# Patient Record
Sex: FEMALE | Race: WHITE | NOT HISPANIC OR LATINO | Employment: FULL TIME | URBAN - METROPOLITAN AREA
[De-identification: names, ages, dates, MRNs, and addresses within clinical notes are randomized per-mention and may not be internally consistent; named-entity substitution may affect disease eponyms.]

---

## 2017-02-07 DIAGNOSIS — R22.1 NECK MASS: Primary | ICD-10-CM

## 2017-02-10 ENCOUNTER — HOSPITAL ENCOUNTER (OUTPATIENT)
Dept: RADIOLOGY | Facility: HOSPITAL | Age: 27
Discharge: HOME | End: 2017-02-10
Attending: SURGERY
Payer: COMMERCIAL

## 2017-02-10 DIAGNOSIS — R22.1 NECK MASS: ICD-10-CM

## 2017-02-10 PROCEDURE — 76536 US EXAM OF HEAD AND NECK: CPT | Mod: TC

## 2017-02-10 PROCEDURE — 76536 US EXAM OF HEAD AND NECK: CPT | Mod: 26,,, | Performed by: RADIOLOGY

## 2017-02-13 DIAGNOSIS — E04.1 THYROID NODULE: Primary | ICD-10-CM

## 2017-02-14 ENCOUNTER — HOSPITAL ENCOUNTER (OUTPATIENT)
Dept: RADIOLOGY | Facility: HOSPITAL | Age: 27
Discharge: HOME | End: 2017-02-14
Attending: SURGERY
Payer: COMMERCIAL

## 2017-02-14 DIAGNOSIS — E04.1 THYROID NODULE: ICD-10-CM

## 2017-02-14 PROCEDURE — 76942 ECHO GUIDE FOR BIOPSY: CPT | Mod: TC

## 2017-02-14 PROCEDURE — 76942 ECHO GUIDE FOR BIOPSY: CPT | Mod: 26,,, | Performed by: RADIOLOGY

## 2017-02-14 PROCEDURE — 88173 CYTOPATH EVAL FNA REPORT: CPT | Performed by: PATHOLOGY

## 2017-02-14 PROCEDURE — 88173 CYTOPATH EVAL FNA REPORT: CPT | Mod: 26,,, | Performed by: PATHOLOGY

## 2017-02-14 PROCEDURE — 10022 US FINE NEEDLE ASPIRATION WITH IMAGING: CPT | Mod: ,,, | Performed by: RADIOLOGY

## 2017-02-14 NOTE — PROCEDURES
Radiology Post-Procedure Note    Pre Op Diagnosis: Thyroid nodule    Post Op Diagnosis: same    Procedure: Ultrasound guided thyroid biopsy    Procedure performed by: Kailey Alfaro MD    Written Informed Consent Obtained: Yes    Specimen Removed: YES 2 passes and 2cc of fluid were aspirated and discarded    Estimated Blood Loss: Minimal    Findings: Local anesthesia was used.    Fine needle aspiration was performed for evaluation of right sided thyroid nodule using ultrasound guidance.  Cytology was present during the examination to evaluate adequacy of the specimen which was sent to the laboratory for further analysis.    2cc of fluid were aspirated and discarded.    There were no complications and the patient tolerated the procedure well.  Please see Imaging report for further details.    [facility]  Radiology PGY-3

## 2017-02-14 NOTE — H&P
Radiology History & Physical      SUBJECTIVE:     Chief Complaint: right neck swelling     History of Present Illness:  Daphnie Vazquez is a 27 y.o. female with right sided thyroid nodule who presents for US guided FNA.  No past medical history on file.  No past surgical history on file.    Home Meds:   Prior to Admission medications    Not on File     Anticoagulants/Antiplatelets: no anticoagulation    Allergies: Review of patient's allergies indicates:  No Known Allergies  Sedation History:  no adverse reactions    Review of Systems:   Hematological: no known coagulopathies  Respiratory: no shortness of breath  Cardiovascular: no chest pain  Gastrointestinal: no abdominal pain  Genito-Urinary: no dysuria  Musculoskeletal: negative  Neurological: no TIA or stroke symptoms         OBJECTIVE:     Vital Signs (Most Recent)       Physical Exam:  ASA: 1  Mallampati: n/a    General: no acute distress  Mental Status: alert and oriented to person, place and time  HEENT: normocephalic, atraumatic  Chest: unlabored breathing  Heart: regular heart rate  Abdomen: nondistended  Extremity: moves all extremities    Laboratory  No results found for: INR  No results found for: WBC, HGB, HCT, MCV, PLT No results found for: GLU, NA, K, CL, CO2, BUN, CREATININE, CALCIUM, MG, ALT, AST, ALBUMIN, BILITOT, BILIDIR    ASSESSMENT/PLAN:     Sedation Plan: local only   Patient will undergo US guided right thyroid nodule FNA.    Luis Cordova  Radiology PGY-3

## 2017-02-16 ENCOUNTER — LAB VISIT (OUTPATIENT)
Dept: LAB | Facility: HOSPITAL | Age: 27
End: 2017-02-16
Attending: SURGERY
Payer: COMMERCIAL

## 2017-02-16 ENCOUNTER — OFFICE VISIT (OUTPATIENT)
Dept: SURGERY | Facility: CLINIC | Age: 27
End: 2017-02-16
Payer: COMMERCIAL

## 2017-02-16 VITALS — HEART RATE: 83 BPM | DIASTOLIC BLOOD PRESSURE: 61 MMHG | TEMPERATURE: 98 F | SYSTOLIC BLOOD PRESSURE: 118 MMHG

## 2017-02-16 DIAGNOSIS — E04.1 THYROID NODULE: Primary | ICD-10-CM

## 2017-02-16 DIAGNOSIS — E04.1 THYROID NODULE: ICD-10-CM

## 2017-02-16 LAB — TSH SERPL DL<=0.005 MIU/L-ACNC: 1.93 UIU/ML

## 2017-02-16 PROCEDURE — 99999 PR PBB SHADOW E&M-EST. PATIENT-LVL II: CPT | Mod: PBBFAC,,, | Performed by: SURGERY

## 2017-02-16 PROCEDURE — 99204 OFFICE O/P NEW MOD 45 MIN: CPT | Mod: S$GLB,,, | Performed by: SURGERY

## 2017-02-16 PROCEDURE — 36415 COLL VENOUS BLD VENIPUNCTURE: CPT

## 2017-02-16 PROCEDURE — 84443 ASSAY THYROID STIM HORMONE: CPT

## 2017-02-20 PROBLEM — E04.1 THYROID NODULE: Status: ACTIVE | Noted: 2017-02-20

## 2017-02-20 NOTE — PROGRESS NOTES
DATE OF CONSULTATION:  02/16/2017    CHIEF COMPLAINT:  Right thyroid nodule.    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  Her history of   right thyroid nodular process dates back to approximately 2007, when she had an   approximate 3-cm right thyroid nodule identified when she was approximately a   freshman in college in 2007.  This was consistent more with a simple cystic like   lesion, which was benign on biopsy.  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  She had another repeat   right-sided fine needle aspiration biopsy at that point in 2009, which revealed   an atypia of undetermined significance/FLUS etiology.  She followed this   clinically.  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  She also has a right submandibular lymph   node, which is approximately 1.5 cm.  She underwent a recent ultrasound on   02/10/2017, which revealed a right-sided thyroid nodule measuring 3.2 cm in   greatest dimension.  It was heterogeneous with a cystic component and a   prominent vessel located inferiorly.  She also had some benign appearing   left-sided nodules, the largest of which was 1 cm.  The left-sided nodules did   not meet criteria for fine needle aspiration biopsy.  She underwent repeat fine   needle aspiration biopsy on 02/14/2017, which revealed a follicular lesion of   undetermined significance, again consistent with a FLUS on the right side.  I   reviewed the thyroid images with her.  We obtained a TSH level today at the date   of consultation on 02/16/2017, which returned at 1.929.  She is clinically and   biochemically euthyroid.  She presents today to discuss options regarding   management of her right thyroid  nodule.    PAST MEDICAL HISTORY:  Significant for Raynaud's phenomenon.    PAST SURGICAL HISTORY:  Significant for breast augmentation.    MEDICATIONS:  Include Ritalin for ADHD, initially prescribed as an adult.    ALLERGIES:  She has no known drug allergies.    REVIEW OF SYSTEMS:  Reveals that there is no family history of thyroid carcinoma   or thyroid disease.  She denies any history of radiation exposure.  She denies   any dysphasia, trouble swallowing, odynophagia or difficulty breathing.  She   denies any respiratory symptoms, shortness of breath or stridor with this   nodule.  She is clinically aware of it and detected it essentially on   self-examination.    PHYSICAL EXAMINATION:  HEAD AND NECK:  Her clinical exam reveals approximately a 3-cm right-sided   nodule.  I do feel the 1.5 cm right submandibular node.  She has an essentially   symmetrical left-sided 1 cm left submandibular node versus gland that is   palpable.  I do not feel any suspicious lymphadenopathy in the lower neck.    ASSESSMENT AND PLAN:  The patient was counseled on options.  We reviewed the   images of the thyroid.  Since she has had this nodule for several years and has   had 2 biopsies revealing AUS/FLUS and this is now her third biopsy, she desires   elective excision.  She is motivated for a hemithyroidectomy to potentially   avoid thyroid hormone replacement.  We discussed that typically approximately   30% of patients that undergo a hemithyroidectomy may still need to take some   thyroid hormone supplementation.  We discussed some of the risks of surgery   including recurrent laryngeal nerve injury, which is low, typically at 1% and   hypocalcemia and hypoparathyroidism, which is also low and technically should be   essentially absent if we are performing hemithyroidectomy only.  The patient is   motivated to proceed with hemithyroidectomy at some point.  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    We discussed the potential for completion thyroidectomy should the pathology   reveal a follicular or papillary carcinoma in preparation for potential   radioactive iodine ablation.  She understands that she may need a completion   thyroidectomy.  We discussed that the likelihood of this being a malignant   lesion ranges somewhere between 5% and 15% with this [place] 3 type   classification.  The patient will follow up with me at her discretion.  We will   also help her obtain a PCP through the [facility] system.  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  Time spent with the   patient today was 45 minutes with greater than 50% of that time in counseling.      RLC/HN  dd: 02/20/2017 13:57:12 (CST)  td: 02/21/2017 06:24:07 (CST)  Doc ID   #[id number]  Job ID #[id number]    CC:     Job # [id number].

## 2017-03-15 DIAGNOSIS — E04.1 THYROID NODULE: Primary | ICD-10-CM

## 2017-04-18 ENCOUNTER — ANESTHESIA EVENT (OUTPATIENT)
Dept: SURGERY | Facility: HOSPITAL | Age: 27
End: 2017-04-18
Payer: COMMERCIAL

## 2017-04-18 ENCOUNTER — TELEPHONE (OUTPATIENT)
Dept: SURGERY | Facility: CLINIC | Age: 27
End: 2017-04-18

## 2017-04-18 NOTE — ANESTHESIA PREPROCEDURE EVALUATION
04/18/2017  Daphnie Vazquez is a 27 y.o., female.    Anesthesia Evaluation    I have reviewed the Patient Summary Reports.    I have reviewed the Nursing Notes.   I have reviewed the Medications.     Review of Systems  Anesthesia Hx:  No problems with previous Anesthesia  History of prior surgery of interest to airway management or planning: Denies Family Hx of Anesthesia complications.   Denies Personal Hx of Anesthesia complications.   Social:  Non-Smoker    Hematology/Oncology:  Hematology Normal   Oncology Normal     EENT/Dental:EENT/Dental Normal   Cardiovascular:   Exercise tolerance: good Dysrhythmias H/O PVC's, cardiac workup negative, associated with dehydration   Pulmonary:  Pulmonary Normal    Renal/:  Renal/ Normal     Hepatic/GI:  Hepatic/GI Normal    Neurological:  Neurology Normal    Endocrine:   Thyroid nodule   Psych:   Psychiatric History          Physical Exam  General:  Well nourished    Airway/Jaw/Neck:  Airway Findings: Mouth Opening: Normal Tongue: Normal  General Airway Assessment: Adult  Mallampati: I      Dental:  Dental Findings: In tact   Chest/Lungs:  Chest/Lungs Findings: Clear to auscultation, Normal Respiratory Rate     Heart/Vascular:  Heart Findings: Rate: Normal  Rhythm: Regular Rhythm        Mental Status:  Mental Status Findings:  Cooperative, Alert and Oriented         Anesthesia Plan  Type of Anesthesia, risks & benefits discussed:  Anesthesia Type:  general  Patient's Preference:   Intra-op Monitoring Plan:   Intra-op Monitoring Plan Comments:   Post Op Pain Control Plan:   Post Op Pain Control Plan Comments:   Induction:   IV  Beta Blocker:  Patient is not currently on a Beta-Blocker (No further documentation required).       Informed Consent: Patient understands risks and agrees with Anesthesia plan.  Questions answered. Anesthesia consent signed with  patient.  ASA Score: 1     Day of Surgery Review of History & Physical:    H&P update referred to the surgeon.         Ready For Surgery From Anesthesia Perspective.

## 2017-04-18 NOTE — TELEPHONE ENCOUNTER
Spoke with pt, she is to arrive for surgery with Dr Rooney on 4/19/2017 @ 9am on the 2nd floor [facility], pt verbally  stated understanding

## 2017-04-19 ENCOUNTER — HOSPITAL ENCOUNTER (OUTPATIENT)
Facility: HOSPITAL | Age: 27
Discharge: HOME | End: 2017-04-20
Attending: SURGERY | Admitting: SURGERY
Payer: COMMERCIAL

## 2017-04-19 ENCOUNTER — ANESTHESIA (OUTPATIENT)
Dept: SURGERY | Facility: HOSPITAL | Age: 27
End: 2017-04-19
Payer: COMMERCIAL

## 2017-04-19 DIAGNOSIS — E04.1 THYROID NODULE: ICD-10-CM

## 2017-04-19 PROCEDURE — 71000033 HC RECOVERY, INTIAL HOUR: Performed by: SURGERY

## 2017-04-19 PROCEDURE — 60220 PARTIAL REMOVAL OF THYROID: CPT | Mod: RT,,, | Performed by: SURGERY

## 2017-04-19 PROCEDURE — 25000003 PHARM REV CODE 250: Performed by: STUDENT IN AN ORGANIZED HEALTH CARE EDUCATION/TRAINING PROGRAM

## 2017-04-19 PROCEDURE — 25000003 PHARM REV CODE 250: Performed by: ANESTHESIOLOGY

## 2017-04-19 PROCEDURE — 94761 N-INVAS EAR/PLS OXIMETRY MLT: CPT

## 2017-04-19 PROCEDURE — 63600175 PHARM REV CODE 636 W HCPCS: Performed by: ANESTHESIOLOGY

## 2017-04-19 PROCEDURE — 88307 TISSUE EXAM BY PATHOLOGIST: CPT

## 2017-04-19 PROCEDURE — 36000707: Performed by: SURGERY

## 2017-04-19 PROCEDURE — 27201423 OPTIME MED/SURG SUP & DEVICES STERILE SUPPLY: Performed by: SURGERY

## 2017-04-19 PROCEDURE — 88331 PATH CONSLTJ SURG 1 BLK 1SPC: CPT | Mod: 26,,,

## 2017-04-19 PROCEDURE — 63600175 PHARM REV CODE 636 W HCPCS: Performed by: STUDENT IN AN ORGANIZED HEALTH CARE EDUCATION/TRAINING PROGRAM

## 2017-04-19 PROCEDURE — 37000009 HC ANESTHESIA EA ADD 15 MINS: Performed by: SURGERY

## 2017-04-19 PROCEDURE — 63600175 PHARM REV CODE 636 W HCPCS

## 2017-04-19 PROCEDURE — C1729 CATH, DRAINAGE: HCPCS | Performed by: SURGERY

## 2017-04-19 PROCEDURE — 36000706: Performed by: SURGERY

## 2017-04-19 PROCEDURE — 88307 TISSUE EXAM BY PATHOLOGIST: CPT | Mod: 26,,,

## 2017-04-19 PROCEDURE — D9220A PRA ANESTHESIA: Mod: ,,, | Performed by: ANESTHESIOLOGY

## 2017-04-19 PROCEDURE — 25000003 PHARM REV CODE 250: Performed by: SURGERY

## 2017-04-19 PROCEDURE — 37000008 HC ANESTHESIA 1ST 15 MINUTES: Performed by: SURGERY

## 2017-04-19 PROCEDURE — 71000039 HC RECOVERY, EACH ADD'L HOUR: Performed by: SURGERY

## 2017-04-19 RX ORDER — ONDANSETRON 8 MG/1
8 TABLET, ORALLY DISINTEGRATING ORAL EVERY 8 HOURS PRN
Status: DISCONTINUED | OUTPATIENT
Start: 2017-04-19 | End: 2017-04-20 | Stop reason: HOSPADM

## 2017-04-19 RX ORDER — DOCUSATE SODIUM 100 MG/1
100 CAPSULE, LIQUID FILLED ORAL 2 TIMES DAILY
Status: DISCONTINUED | OUTPATIENT
Start: 2017-04-19 | End: 2017-04-20 | Stop reason: HOSPADM

## 2017-04-19 RX ORDER — BUPIVACAINE HYDROCHLORIDE 5 MG/ML
INJECTION, SOLUTION EPIDURAL; INTRACAUDAL
Status: DISCONTINUED | OUTPATIENT
Start: 2017-04-19 | End: 2017-04-19 | Stop reason: HOSPADM

## 2017-04-19 RX ORDER — DIPHENHYDRAMINE HYDROCHLORIDE 50 MG/ML
25 INJECTION INTRAMUSCULAR; INTRAVENOUS EVERY 4 HOURS PRN
Status: DISCONTINUED | OUTPATIENT
Start: 2017-04-19 | End: 2017-04-20 | Stop reason: HOSPADM

## 2017-04-19 RX ORDER — SODIUM CHLORIDE 0.9 % (FLUSH) 0.9 %
3 SYRINGE (ML) INJECTION EVERY 8 HOURS
Status: DISCONTINUED | OUTPATIENT
Start: 2017-04-19 | End: 2017-04-20 | Stop reason: HOSPADM

## 2017-04-19 RX ORDER — HYDROMORPHONE HYDROCHLORIDE 1 MG/ML
0.2 INJECTION, SOLUTION INTRAMUSCULAR; INTRAVENOUS; SUBCUTANEOUS EVERY 5 MIN PRN
Status: DISCONTINUED | OUTPATIENT
Start: 2017-04-19 | End: 2017-04-19 | Stop reason: HOSPADM

## 2017-04-19 RX ORDER — ONDANSETRON 2 MG/ML
INJECTION INTRAMUSCULAR; INTRAVENOUS
Status: DISCONTINUED | OUTPATIENT
Start: 2017-04-19 | End: 2017-04-19

## 2017-04-19 RX ORDER — FENTANYL CITRATE 50 UG/ML
INJECTION, SOLUTION INTRAMUSCULAR; INTRAVENOUS
Status: DISCONTINUED | OUTPATIENT
Start: 2017-04-19 | End: 2017-04-19

## 2017-04-19 RX ORDER — IBUPROFEN 600 MG/1
600 TABLET ORAL EVERY 6 HOURS PRN
Status: DISCONTINUED | OUTPATIENT
Start: 2017-04-19 | End: 2017-04-20 | Stop reason: HOSPADM

## 2017-04-19 RX ORDER — LIDOCAINE HYDROCHLORIDE 10 MG/ML
1 INJECTION, SOLUTION EPIDURAL; INFILTRATION; INTRACAUDAL; PERINEURAL ONCE
Status: COMPLETED | OUTPATIENT
Start: 2017-04-19 | End: 2017-04-19

## 2017-04-19 RX ORDER — SODIUM CHLORIDE 0.9 % (FLUSH) 0.9 %
3 SYRINGE (ML) INJECTION
Status: DISCONTINUED | OUTPATIENT
Start: 2017-04-19 | End: 2017-04-19

## 2017-04-19 RX ORDER — LIDOCAINE HCL/PF 100 MG/5ML
SYRINGE (ML) INTRAVENOUS
Status: DISCONTINUED | OUTPATIENT
Start: 2017-04-19 | End: 2017-04-19

## 2017-04-19 RX ORDER — KETAMINE HCL IN 0.9 % NACL 50 MG/5 ML
SYRINGE (ML) INTRAVENOUS
Status: DISCONTINUED | OUTPATIENT
Start: 2017-04-19 | End: 2017-04-19

## 2017-04-19 RX ORDER — MIDAZOLAM HYDROCHLORIDE 1 MG/ML
INJECTION, SOLUTION INTRAMUSCULAR; INTRAVENOUS
Status: DISCONTINUED | OUTPATIENT
Start: 2017-04-19 | End: 2017-04-19

## 2017-04-19 RX ORDER — SODIUM CHLORIDE 9 MG/ML
INJECTION, SOLUTION INTRAVENOUS CONTINUOUS
Status: DISCONTINUED | OUTPATIENT
Start: 2017-04-19 | End: 2017-04-19

## 2017-04-19 RX ORDER — OXYCODONE AND ACETAMINOPHEN 5; 325 MG/1; MG/1
TABLET ORAL
Status: DISCONTINUED
Start: 2017-04-19 | End: 2017-04-19 | Stop reason: WASHOUT

## 2017-04-19 RX ORDER — ONDANSETRON 2 MG/ML
4 INJECTION INTRAMUSCULAR; INTRAVENOUS ONCE AS NEEDED
Status: COMPLETED | OUTPATIENT
Start: 2017-04-19 | End: 2017-04-19

## 2017-04-19 RX ORDER — PROPOFOL 10 MG/ML
VIAL (ML) INTRAVENOUS
Status: DISCONTINUED | OUTPATIENT
Start: 2017-04-19 | End: 2017-04-19

## 2017-04-19 RX ORDER — ONDANSETRON 2 MG/ML
4 INJECTION INTRAMUSCULAR; INTRAVENOUS DAILY PRN
Status: DISCONTINUED | OUTPATIENT
Start: 2017-04-19 | End: 2017-04-19 | Stop reason: HOSPADM

## 2017-04-19 RX ORDER — DEXAMETHASONE SODIUM PHOSPHATE 4 MG/ML
INJECTION, SOLUTION INTRA-ARTICULAR; INTRALESIONAL; INTRAMUSCULAR; INTRAVENOUS; SOFT TISSUE
Status: DISCONTINUED | OUTPATIENT
Start: 2017-04-19 | End: 2017-04-19

## 2017-04-19 RX ORDER — PHENYLEPHRINE HYDROCHLORIDE 10 MG/ML
INJECTION INTRAVENOUS
Status: DISCONTINUED | OUTPATIENT
Start: 2017-04-19 | End: 2017-04-19

## 2017-04-19 RX ORDER — HYDROMORPHONE HYDROCHLORIDE 1 MG/ML
INJECTION, SOLUTION INTRAMUSCULAR; INTRAVENOUS; SUBCUTANEOUS
Status: DISCONTINUED
Start: 2017-04-19 | End: 2017-04-19 | Stop reason: WASHOUT

## 2017-04-19 RX ORDER — ACETAMINOPHEN 325 MG/1
650 TABLET ORAL EVERY 8 HOURS PRN
Status: DISCONTINUED | OUTPATIENT
Start: 2017-04-19 | End: 2017-04-20 | Stop reason: HOSPADM

## 2017-04-19 RX ORDER — ONDANSETRON 2 MG/ML
INJECTION INTRAMUSCULAR; INTRAVENOUS
Status: COMPLETED
Start: 2017-04-19 | End: 2017-04-19

## 2017-04-19 RX ORDER — HYDROMORPHONE HYDROCHLORIDE 2 MG/ML
INJECTION, SOLUTION INTRAMUSCULAR; INTRAVENOUS; SUBCUTANEOUS
Status: DISCONTINUED | OUTPATIENT
Start: 2017-04-19 | End: 2017-04-19

## 2017-04-19 RX ORDER — CEFAZOLIN SODIUM 2 G/50ML
2 SOLUTION INTRAVENOUS
Status: COMPLETED | OUTPATIENT
Start: 2017-04-19 | End: 2017-04-19

## 2017-04-19 RX ORDER — ACETAMINOPHEN 325 MG/1
650 TABLET ORAL EVERY 6 HOURS PRN
Status: DISCONTINUED | OUTPATIENT
Start: 2017-04-19 | End: 2017-04-20 | Stop reason: HOSPADM

## 2017-04-19 RX ORDER — OXYCODONE AND ACETAMINOPHEN 5; 325 MG/1; MG/1
1 TABLET ORAL EVERY 4 HOURS PRN
Status: DISCONTINUED | OUTPATIENT
Start: 2017-04-19 | End: 2017-04-20 | Stop reason: HOSPADM

## 2017-04-19 RX ADMIN — REMIFENTANIL HYDROCHLORIDE 0.1 MCG/KG/MIN: 1 INJECTION, POWDER, LYOPHILIZED, FOR SOLUTION INTRAVENOUS at 11:04

## 2017-04-19 RX ADMIN — MIDAZOLAM HYDROCHLORIDE 1 MG: 1 INJECTION, SOLUTION INTRAMUSCULAR; INTRAVENOUS at 10:04

## 2017-04-19 RX ADMIN — EPHEDRINE SULFATE 10 MG: 50 INJECTION, SOLUTION INTRAMUSCULAR; INTRAVENOUS; SUBCUTANEOUS at 11:04

## 2017-04-19 RX ADMIN — ONDANSETRON 4 MG: 2 INJECTION INTRAMUSCULAR; INTRAVENOUS at 01:04

## 2017-04-19 RX ADMIN — OXYCODONE HYDROCHLORIDE AND ACETAMINOPHEN 1 TABLET: 5; 325 TABLET ORAL at 09:04

## 2017-04-19 RX ADMIN — EPHEDRINE SULFATE 5 MG: 50 INJECTION, SOLUTION INTRAMUSCULAR; INTRAVENOUS; SUBCUTANEOUS at 01:04

## 2017-04-19 RX ADMIN — DOCUSATE SODIUM 100 MG: 100 CAPSULE, LIQUID FILLED ORAL at 09:04

## 2017-04-19 RX ADMIN — SODIUM CHLORIDE 500 ML: 0.9 INJECTION, SOLUTION INTRAVENOUS at 02:04

## 2017-04-19 RX ADMIN — HYDROMORPHONE HYDROCHLORIDE 0.2 MG: 2 INJECTION, SOLUTION INTRAMUSCULAR; INTRAVENOUS; SUBCUTANEOUS at 01:04

## 2017-04-19 RX ADMIN — LIDOCAINE HYDROCHLORIDE 0.2 MG: 10 INJECTION, SOLUTION EPIDURAL; INFILTRATION; INTRACAUDAL; PERINEURAL at 10:04

## 2017-04-19 RX ADMIN — PROPOFOL 100 MG: 10 INJECTION, EMULSION INTRAVENOUS at 11:04

## 2017-04-19 RX ADMIN — PROMETHAZINE HYDROCHLORIDE 6.25 MG: 25 INJECTION, SOLUTION INTRAMUSCULAR; INTRAVENOUS at 02:04

## 2017-04-19 RX ADMIN — Medication 3 ML: at 09:04

## 2017-04-19 RX ADMIN — ACETAMINOPHEN 650 MG: 325 TABLET ORAL at 04:04

## 2017-04-19 RX ADMIN — EPHEDRINE SULFATE 5 MG: 50 INJECTION, SOLUTION INTRAMUSCULAR; INTRAVENOUS; SUBCUTANEOUS at 11:04

## 2017-04-19 RX ADMIN — SODIUM CHLORIDE: 0.9 INJECTION, SOLUTION INTRAVENOUS at 10:04

## 2017-04-19 RX ADMIN — PHENYLEPHRINE HYDROCHLORIDE 100 MCG: 10 INJECTION INTRAVENOUS at 11:04

## 2017-04-19 RX ADMIN — SODIUM CHLORIDE, SODIUM GLUCONATE, SODIUM ACETATE, POTASSIUM CHLORIDE, MAGNESIUM CHLORIDE, SODIUM PHOSPHATE, DIBASIC, AND POTASSIUM PHOSPHATE: .53; .5; .37; .037; .03; .012; .00082 INJECTION, SOLUTION INTRAVENOUS at 12:04

## 2017-04-19 RX ADMIN — Medication 20 MG: at 11:04

## 2017-04-19 RX ADMIN — EPHEDRINE SULFATE 5 MG: 50 INJECTION, SOLUTION INTRAMUSCULAR; INTRAVENOUS; SUBCUTANEOUS at 12:04

## 2017-04-19 RX ADMIN — HYDROMORPHONE HYDROCHLORIDE 0.4 MG: 2 INJECTION, SOLUTION INTRAMUSCULAR; INTRAVENOUS; SUBCUTANEOUS at 12:04

## 2017-04-19 RX ADMIN — FENTANYL CITRATE 100 MCG: 50 INJECTION, SOLUTION INTRAMUSCULAR; INTRAVENOUS at 11:04

## 2017-04-19 RX ADMIN — DEXAMETHASONE SODIUM PHOSPHATE 4 MG: 4 INJECTION, SOLUTION INTRAMUSCULAR; INTRAVENOUS at 11:04

## 2017-04-19 RX ADMIN — REMIFENTANIL HYDROCHLORIDE 60 MCG: 1 INJECTION, POWDER, LYOPHILIZED, FOR SOLUTION INTRAVENOUS at 11:04

## 2017-04-19 RX ADMIN — CEFAZOLIN SODIUM 2 G: 2 SOLUTION INTRAVENOUS at 11:04

## 2017-04-19 RX ADMIN — LIDOCAINE HYDROCHLORIDE 60 MG: 20 INJECTION, SOLUTION INTRAVENOUS at 11:04

## 2017-04-19 NOTE — TRANSFER OF CARE
"Anesthesia Transfer of Care Note    Patient: Daphnie Vazquez    Procedure(s) Performed: Procedure(s):  THYROIDECTOMY, right alejandra thyroidectomy    Patient location: PACU    Anesthesia Type: general    Transport from OR: Transported from OR on room air with adequate spontaneous ventilation    Post pain: adequate analgesia    Post assessment: no apparent anesthetic complications    Post vital signs: stable    Level of consciousness: awake and alert    Nausea/Vomiting: no nausea/vomiting    Complications: none          Last vitals:   Visit Vitals    /73 (BP Location: Left arm, Patient Position: Lying, BP Method: Automatic)    Pulse (!) 53    Temp 36.8 °C (98.2 °F) (Oral)    Resp 18    Ht 5' 9" (1.753 m)    Wt 56.7 kg (125 lb)    LMP 04/17/2017    SpO2 100%    Breastfeeding No    BMI 18.46 kg/m2     "

## 2017-04-19 NOTE — H&P
DATE OF CONSULTATION: 02/16/2017     CHIEF COMPLAINT: Right thyroid nodule.     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 Her history of   right thyroid nodular process dates back to approximately 2007, when she had an   approximate 3-cm right thyroid nodule identified when she was approximately a   freshman in college in 2007. This was consistent more with a simple cystic like  lesion, which was benign on biopsy. 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 She had another repeat   right-sided fine needle aspiration biopsy at that point in 2009, which revealed   an atypia of undetermined significance/FLUS etiology. She followed this   clinically. 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 She also has a right submandibular lymph   node, which is approximately 1.5 cm. She underwent a recent ultrasound on   02/10/2017, which revealed a right-sided thyroid nodule measuring 3.2 cm in   greatest dimension. It was heterogeneous with a cystic component and a   prominent vessel located inferiorly. She also had some benign appearing   left-sided nodules, the largest of which was 1 cm. The left-sided nodules did   not meet criteria for fine needle aspiration biopsy. She underwent repeat fine   needle aspiration biopsy on 02/14/2017, which revealed a follicular lesion of   undetermined significance, again consistent with a FLUS on the right side. I   reviewed the thyroid images with her. We obtained a TSH level today at the date  of consultation on 02/16/2017, which returned at 1.929. She is clinically and   biochemically euthyroid. She presents today to discuss options regarding   management of her right thyroid nodule.     PAST MEDICAL  HISTORY: Significant for Raynaud's phenomenon.     PAST SURGICAL HISTORY: Significant for breast augmentation.     MEDICATIONS: Include Ritalin for ADHD, initially prescribed as an adult.     ALLERGIES: She has no known drug allergies.     REVIEW OF SYSTEMS: Reveals that there is no family history of thyroid carcinoma  or thyroid disease. She denies any history of radiation exposure. She denies   any dysphasia, trouble swallowing, odynophagia or difficulty breathing. She   denies any respiratory symptoms, shortness of breath or stridor with this   nodule. She is clinically aware of it and detected it essentially on   self-examination.     PHYSICAL EXAMINATION:  HEAD AND NECK: Her clinical exam reveals approximately a 3-cm right-sided   nodule. I do feel the 1.5 cm right submandibular node. She has an essentially   symmetrical left-sided 1 cm left submandibular node versus gland that is   palpable. I do not feel any suspicious lymphadenopathy in the lower neck.     ASSESSMENT AND PLAN: The patient was counseled on options. We reviewed the   images of the thyroid. Since she has had this nodule for several years and has   had 2 biopsies revealing AUS/FLUS and this is now her third biopsy, she desires   elective excision. She is motivated for a hemithyroidectomy to potentially   avoid thyroid hormone replacement. We discussed that typically approximately   30% of patients that undergo a hemithyroidectomy may still need to take some   thyroid hormone supplementation. We discussed some of the risks of surgery   including recurrent laryngeal nerve injury, which is low, typically at 1% and   hypocalcemia and hypoparathyroidism, which is also low and technically should be  essentially absent if we are performing hemithyroidectomy only. The patient is  motivated to proceed with hemithyroidectomy at some point. She does have her   vacation planned and will assess to see if she has time available through [facility]   during her  internship to see when/if she would like to proceed with the   right-sided hemithyroidectomy for the dominant greater than 3 cm nodule, which   has been present for years and has had 2 biopsies with AUS/FLUS characteristics.  We discussed the potential for completion thyroidectomy should the pathology   reveal a follicular or papillary carcinoma in preparation for potential   radioactive iodine ablation. She understands that she may need a completion   thyroidectomy. We discussed that the likelihood of this being a malignant   lesion ranges somewhere between 5% and 15% with this [place] 3 type   classification. The patient will follow up with me at her discretion. We will   also help her obtain a PCP through the [facility] system. 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 Time spent with the   patient today was 45 minutes with greater than 50% of that time in counseling.    H&P ADDENDUM    The patient has been examined and the H&P has been reviewed:    I concur with the findings and no changes have occurred since H&P was written.    Anesthesia/Surgery risks, benefits and alternative options discussed and understood by patient/family.    Francoise Almonte MD  General Surgery PGY3  Beeper: [phone]

## 2017-04-19 NOTE — ANESTHESIA POSTPROCEDURE EVALUATION
"Anesthesia Post Evaluation    Patient: Daphnie Vazquez    Procedure(s) Performed: Procedure(s):  THYROIDECTOMY, right alejandra thyroidectomy    Final Anesthesia Type: general  Patient location during evaluation: PACU  Patient participation: Yes- Able to Participate  Level of consciousness: awake and alert  Post-procedure vital signs: reviewed and stable  Pain management: adequate  Airway patency: patent  PONV status at discharge: No PONV  Anesthetic complications: no      Cardiovascular status: blood pressure returned to baseline  Respiratory status: unassisted, spontaneous ventilation and room air  Hydration status: euvolemic  Follow-up not needed.        Visit Vitals    /68    Pulse 93    Temp 37.1 °C (98.8 °F) (Oral)    Resp 12    Ht 5' 9" (1.753 m)    Wt 56.7 kg (125 lb)    LMP 04/17/2017    SpO2 99%    Breastfeeding No    BMI 18.46 kg/m2       Pain/Magdalena Score: Pain Assessment Performed: Yes (4/19/2017  1:45 PM)  Presence of Pain: complains of pain/discomfort (4/19/2017  2:45 PM)  Magdalena Score: 9 (4/19/2017  1:45 PM)      "

## 2017-04-19 NOTE — PLAN OF CARE
VSS. Respirations even and unlabored. Pt reports pain is tolerable. Plan of care discussed with patient. Pt verbalized understanding.

## 2017-04-19 NOTE — BRIEF OP NOTE
[facility]  Brief Operative Note    SUMMARY     Surgery Date: 4/19/2017     Surgeon(s) and Role:     * Francoise Almonte MD - Resident - Assisting     * Laith Rooney MD - Primary        Pre-op Diagnosis:  Thyroid nodule [E04.1]    Post-op Diagnosis:  Post-Op Diagnosis Codes:     * Thyroid nodule [E04.1]    Procedure(s):  THYROIDECTOMY, right alejandra thyroidectomy    Anesthesia: * No anesthesia type entered *    Description of Procedure: Right Hemithyroidectomy. Frozen negative for any malignancy    Description of the findings of the procedure: See OP note    Estimated Blood Loss: * No values recorded between 4/19/2017 11:50 AM and 4/19/2017  1:20 PM *         Specimens:   Specimen (12h ago through future)    Start     Ordered    04/19/17 1258  Specimen to Pathology - Surgery  Once     Comments:  1. Right thyroid lobe-frozen, short stitch marks superior pole, long stitch marks isthmus    04/19/17 1258

## 2017-04-19 NOTE — PLAN OF CARE
Problem: Patient Care Overview  Goal: Plan of Care Review  Outcome: Ongoing (interventions implemented as appropriate)  POC reviewed with pt, verbalized understanding. Pt AAOx4, VSS. Pt up ad harinder. Pt tolerating diet. Pt c/o soreness to anterior neck near incision; PO tylenol given. Pt denies any nausea/vomiting at this time. Pt remains free of any falls/injury. Call light within reach, will continue to monitor.

## 2017-04-19 NOTE — NURSING TRANSFER
Nursing Transfer Note      4/19/2017     Transfer To: 610    Transfer via stretcher    Transfer with glasses    Transported by pct    Medicines sent: no    Chart send with patient: Yes    Notified: mother

## 2017-04-19 NOTE — ANESTHESIA RELEASE NOTE
"Anesthesia Release from PACU Note    Patient: Dahpnie Vazquez    Procedure(s) Performed: Procedure(s):  THYROIDECTOMY, right alejandra thyroidectomy    Anesthesia type: general    Post pain: Adequate analgesia    Post assessment: no apparent anesthetic complications and tolerated procedure well    Last Vitals:   Visit Vitals    /68    Pulse 93    Temp 37.1 °C (98.8 °F) (Oral)    Resp 12    Ht 5' 9" (1.753 m)    Wt 56.7 kg (125 lb)    LMP 04/17/2017    SpO2 99%    Breastfeeding No    BMI 18.46 kg/m2       Post vital signs: stable    Level of consciousness: awake and alert     Nausea/Vomiting: no nausea/no vomiting    Complications: none    Airway Patency: patent    Respiratory: unassisted, spontaneous ventilation, room air    Cardiovascular: stable and blood pressure at baseline    Hydration: euvolemic  "

## 2017-04-19 NOTE — IP AVS SNAPSHOT
[facility]  [address]  [facility] [address]  Phone: [phone]           Patient Discharge Instructions   Our goal is to set you up for success. This packet includes information on your condition, medications, and your home care.  It will help you care for yourself to prevent having to return to the hospital.     Please ask your nurse if you have any questions.      There are many details to remember when preparing to leave the hospital. Here is what you will need to do:    1. Take your medicine. If you are prescribed medications, review your Medication List on the following pages. You may have new medications to  at the pharmacy and others that you'll need to stop taking. Review the instructions for how and when to take your medications. Talk with your doctor or nurses if you are unsure of what to do.     2. Go to your follow-up appointments. Specific follow-up information is listed in the following pages. Your may be contacted by a nurse or clinical provider about future appointments. Be sure we have all of the phone numbers to reach you. Please contact your provider's office if you are unable to make an appointment.     3. Watch for warning signs. Your doctor or nurse will give you detailed warning signs to watch for and when to call for assistance. These instructions may also include educational information about your condition. If you experience any of warning signs to your health, call your doctor.           [facility]  Unless otherwise directed by your provider, please   contact [facility], our nurse care line   that is available for 24/7 assistance.     [phone] (toll-free)     Registered nurses in the [facility]   provide: appointment scheduling, clinical advisement, health education, and other advisory services.                  ** Verify the list of medication(s) below is accurate and up to date. Carry this with you in case of  emergency. If your medications have changed, please notify your healthcare provider.             Medication List      START taking these medications        Additional Info                      ondansetron 8 MG Tbdl   Commonly known as:  ZOFRAN-ODT   Quantity:  20 tablet   Refills:  0   Dose:  8 mg    Instructions:  Take 1 tablet (8 mg total) by mouth every 6 (six) hours as needed.     Begin Date    AM    Noon    PM    Bedtime       oxycodone-acetaminophen 5-325 mg per tablet   Commonly known as:  PERCOCET   Quantity:  60 tablet   Refills:  0   Dose:  1 tablet    Last time this was given:  1 tablet on 4/20/2017  4:04 AM   Instructions:  Take 1 tablet by mouth every 4 (four) hours as needed for Pain.     Begin Date    AM    Noon    PM    Bedtime            Where to Get Your Medications      You can get these medications from any pharmacy     Bring a paper prescription for each of these medications     ondansetron 8 MG Tbdl    oxycodone-acetaminophen 5-325 mg per tablet                  Please bring to all follow up appointments:    1. A copy of your discharge instructions.  2. All medicines you are currently taking in their original bottles.  3. Identification and insurance card.    Please arrive 15 minutes ahead of scheduled appointment time.    Please call 24 hours in advance if you must reschedule your appointment and/or time.        Your Scheduled Appointments     May 04, 2017  4:15 PM CDT   Post OP with MD Jordan Carlos [facility] )    [address]  [place] [address] [phone]              Follow-up Information     Follow up with Laith Rooney MD In 1 week.    Specialty:  General Surgery    Contact information:    [address] [facility] [address] [phone]          Discharge Instructions     Future Orders    Activity as tolerated     Call MD for:  difficulty breathing or increased cough     Call MD for:  increased confusion or  weakness     Call MD for:  persistent dizziness, light-headedness, or visual disturbances     Call MD for:  persistent nausea and vomiting or diarrhea     Call MD for:  redness, tenderness, or signs of infection (pain, swelling, redness, odor or green/yellow discharge around incision site)     Call MD for:  severe persistent headache     Call MD for:  severe uncontrolled pain     Call MD for:  temperature >100.4     Call MD for:  worsening rash     Diet general     Questions:    Total calories:      Fat restriction, if any:      Protein restriction, if any:      Na restriction, if any:      Fluid restriction:      Additional restrictions:      Shower on day dressing removed (No bath)         Discharge Instructions       Recovering after Endocrine Surgery    Type of Procedure: Thyroidectomy    Postoperative clinic appointment date: 1-2 weeks after surgery    Activity:  You may resume normal daily activities upon discharge.  This includes walking and climbing stairs.  Avoid heavy lifting and vigorous exercise for approximately 2 weeks.    Showering:  You may resume normal showering and bathing with the plastic dressing in place.  Your incision does not require special care and it may get wet after 48 hours.    Incision:  Your neck incision is closed with absorbable sutures under the skin.  You will not need to have any stitches removed.  Small pieces of tape called Steri-Strips cover the outside of your incision.  These strips will be removed during your postoperative visit or will fall off on their own. You have a plastic dressing over the incision- this plastic dressing keeps the incision water-proof, you can remove the plastic dressing 48 hours after surgery.    Pain:  After surgery you may experience pain at the incision, generalized neck pain, or a sore throat.  You will be given a prescription for pain relief.  Most patients will still have discomfort 2-3 days after surgery.  Many times, over the counter pain  medications, such as Extra Strength Tylenol, are effective.    Driving:  Use your judgment in resuming to drive.  Avoid driving if you are still experiencing neck pain and are using prescription pain medications.      Return to Work:  Recovery after surgery depends on the individual.  Most patients can expect to return to work approximately 1-2 weeks after surgery.    Diet:  You may resume your normal diet after surgery without any restrictions.    Constipation:  Anesthesia and pain medication can cause a decrease in bowel motility.  If you experience constipation postoperatively, you may take over the counter laxatives such as Milk of Magnesia.    Calcium Supplements:  Calcium tablets are recommended after your surgery. You should take 1 gram of Calcium twice a day (1 gram in the morning, 1 gram in the evening) for the first week, then 1 gram of Calcium per day for the second week. Most often this is to promote bone health and prevent low blood calcium during recovery.  Numbness and tingling in your fingertips or around your mouth may be experienced when calcium levels are low.  If tingling or numbness occurs, double up on the Calcium; if you experience cramping in your hands or legs, double up on the Calcium and call you doctor.  Extra Strength Tums may be substituted for calcium tablets.    Medications:  You may resume taking medication as instructed by your MD at discharge from the hospital.     Questions/Concerns:  If you have any questions or concerns please call your doctor's office or after hours call [phone] and ask for the General Surgery Resident on call.  Dr. Dee Bondyrn Cope [facility]  [phone]        Primary Diagnosis     Your primary diagnosis was:  Thyroid Nodule      Admission Information     Date & Time Provider Department CSN    4/19/2017  9:07 AM Laith Rooney MD [facility] [id number]     "  Care Providers     Provider Role Specialty Primary office phone    Laith Rooney MD Attending Provider General Surgery [phone]    Laith Rooney MD Surgeon  General Surgery [phone]      Your Vitals Were     BP Pulse Temp Resp Height Weight    120/73 (BP Location: Right arm, Patient Position: Lying, BP Method: Automatic) 67 98.1 °F (36.7 °C) (Oral) 16 5' 9" (1.753 m) 56.7 kg (125 lb)    Last Period SpO2 BMI          04/17/2017 100% 18.46 kg/m2        Recent Lab Values     No lab values to display.      Pending Labs     Order Current Status    Specimen to Pathology - Surgery In process      Allergies as of 4/20/2017     No Known Allergies      Advance Directives     An advance directive is a document which, in the event you are no longer able to make decisions for yourself, tells your healthcare team what kind of treatment you do or do not want to receive, or who you would like to make those decisions for you.  If you do not currently have an advance directive, [facility] encourages you to create one.  For more information call:  [phone]), [phone] ([phone]),  or log on to [email or web address].        Language Assistance Services     ATTENTION: Language assistance services are available, free of charge. Please call [phone].      ATENCIÓN: Si habla español, tiene a brewer disposición servicios gratuitos de asistencia lingüística. Llame al [phone].     CHÚ Ý: N?u b?n nói Ti?ng Vi?t, có các d?ch v? h? tr? ngôn ng? mi?n phí dành cho b?n. G?i s? [phone].         [facility] complies with applicable Federal civil rights laws and does not discriminate on the basis of race, color, national origin, age, disability, or sex.        "

## 2017-04-20 VITALS
SYSTOLIC BLOOD PRESSURE: 120 MMHG | DIASTOLIC BLOOD PRESSURE: 73 MMHG | WEIGHT: 125 LBS | TEMPERATURE: 98 F | RESPIRATION RATE: 16 BRPM | HEART RATE: 67 BPM | BODY MASS INDEX: 18.51 KG/M2 | OXYGEN SATURATION: 100 % | HEIGHT: 69 IN

## 2017-04-20 PROCEDURE — 25000003 PHARM REV CODE 250: Performed by: STUDENT IN AN ORGANIZED HEALTH CARE EDUCATION/TRAINING PROGRAM

## 2017-04-20 RX ORDER — ONDANSETRON 8 MG/1
8 TABLET, ORALLY DISINTEGRATING ORAL EVERY 6 HOURS PRN
Qty: 20 TABLET | Refills: 0 | Status: SHIPPED | OUTPATIENT
Start: 2017-04-20 | End: 2018-03-05

## 2017-04-20 RX ORDER — OXYCODONE AND ACETAMINOPHEN 5; 325 MG/1; MG/1
1 TABLET ORAL EVERY 4 HOURS PRN
Qty: 60 TABLET | Refills: 0 | Status: SHIPPED | OUTPATIENT
Start: 2017-04-20 | End: 2018-03-05

## 2017-04-20 RX ADMIN — OXYCODONE HYDROCHLORIDE AND ACETAMINOPHEN 1 TABLET: 5; 325 TABLET ORAL at 04:04

## 2017-04-20 RX ADMIN — OXYCODONE HYDROCHLORIDE AND ACETAMINOPHEN 1 TABLET: 5; 325 TABLET ORAL at 08:04

## 2017-04-20 RX ADMIN — DOCUSATE SODIUM 100 MG: 100 CAPSULE, LIQUID FILLED ORAL at 08:04

## 2017-04-20 NOTE — PROGRESS NOTES
[facility]  General Surgery  Progress Note    Subjective:     History of Present Illness:       Post-Op Info:  Procedure(s):  THYROIDECTOMY, right alejandra thyroidectomy   1 Day Post-Op     Interval History:   NAEON. Pain controlled. Tolerating diet. No voice changes.    Medications:  Continuous Infusions:   Scheduled Meds:   docusate sodium  100 mg Oral BID    sodium chloride 0.9%  3 mL Intravenous Q8H     PRN Meds:acetaminophen, acetaminophen, diphenhydrAMINE, ibuprofen, ondansetron, oxycodone-acetaminophen, promethazine (PHENERGAN) IVPB     Review of patient's allergies indicates:  No Known Allergies  Objective:     Vital Signs (Most Recent):  Temp: 98.6 °F (37 °C) (04/19/17 2030)  Pulse: 72 (04/19/17 2030)  Resp: 16 (04/19/17 2030)  BP: 123/66 (04/19/17 2030)  SpO2: 100 % (04/19/17 2030) Vital Signs (24h Range):  Temp:  [97.6 °F (36.4 °C)-98.8 °F (37.1 °C)] 98.6 °F (37 °C)  Pulse:  [] 72  Resp:  [12-39] 16  SpO2:  [97 %-100 %] 100 %  BP: (105-131)/(57-80) 123/66     Weight: 56.7 kg (125 lb)  Body mass index is 18.46 kg/(m^2).    Intake/Output - Last 3 Shifts       04/18 0700 - 04/19 0659 04/19 0700 - 04/20 0659 04/20 0700 - 04/21 0659    I.V. (mL/kg)  1500 (26.5)     Total Intake(mL/kg)  1500 (26.5)     Net   +1500             Urine Occurrence  4 x           Physical Exam   Constitutional: She is oriented to person, place, and time. She appears well-developed.   HENT:   Head: Normocephalic.   Eyes: Pupils are equal, round, and reactive to light.   Neck: Neck supple.   No hematoma or erythema surrounding incision   Cardiovascular: Normal rate and regular rhythm.    Pulmonary/Chest: Effort normal. No stridor. No respiratory distress.   Abdominal: She exhibits no distension.   Neurological: She is alert and oriented to person, place, and time.   Skin: Skin is warm and dry.   Psychiatric: She has a normal mood and affect. Her behavior is normal.           Assessment/Plan:     Thyroid  nodule  REgular diet  Pain control  Nausea control  Home today      Pauly Sanchez MD  General Surgery  [facility]

## 2017-04-20 NOTE — DISCHARGE INSTRUCTIONS
Recovering after Endocrine Surgery    Type of Procedure: Thyroidectomy    Postoperative clinic appointment date: 1-2 weeks after surgery    Activity:  You may resume normal daily activities upon discharge.  This includes walking and climbing stairs.  Avoid heavy lifting and vigorous exercise for approximately 2 weeks.    Showering:  You may resume normal showering and bathing with the plastic dressing in place.  Your incision does not require special care and it may get wet after 48 hours.    Incision:  Your neck incision is closed with absorbable sutures under the skin.  You will not need to have any stitches removed.  Small pieces of tape called Steri-Strips cover the outside of your incision.  These strips will be removed during your postoperative visit or will fall off on their own. You have a plastic dressing over the incision- this plastic dressing keeps the incision water-proof, you can remove the plastic dressing 48 hours after surgery.    Pain:  After surgery you may experience pain at the incision, generalized neck pain, or a sore throat.  You will be given a prescription for pain relief.  Most patients will still have discomfort 2-3 days after surgery.  Many times, over the counter pain medications, such as Extra Strength Tylenol, are effective.    Driving:  Use your judgment in resuming to drive.  Avoid driving if you are still experiencing neck pain and are using prescription pain medications.      Return to Work:  Recovery after surgery depends on the individual.  Most patients can expect to return to work approximately 1-2 weeks after surgery.    Diet:  You may resume your normal diet after surgery without any restrictions.    Constipation:  Anesthesia and pain medication can cause a decrease in bowel motility.  If you experience constipation postoperatively, you may take over the counter laxatives such as Milk of Magnesia.    Calcium Supplements:  Calcium tablets are recommended after your surgery.  You should take 1 gram of Calcium twice a day (1 gram in the morning, 1 gram in the evening) for the first week, then 1 gram of Calcium per day for the second week. Most often this is to promote bone health and prevent low blood calcium during recovery.  Numbness and tingling in your fingertips or around your mouth may be experienced when calcium levels are low.  If tingling or numbness occurs, double up on the Calcium; if you experience cramping in your hands or legs, double up on the Calcium and call you doctor.  Extra Strength Tums may be substituted for calcium tablets.    Medications:  You may resume taking medication as instructed by your MD at discharge from the hospital.     Questions/Concerns:  If you have any questions or concerns please call your doctor's office or after hours call [phone] and ask for the General Surgery Resident on call.  Dr. Dee Cope [facility]  [phone]

## 2017-04-20 NOTE — SUBJECTIVE & OBJECTIVE
Interval History:   NAEON. Pain controlled. Tolerating diet. No voice changes.    Medications:  Continuous Infusions:   Scheduled Meds:   docusate sodium  100 mg Oral BID    sodium chloride 0.9%  3 mL Intravenous Q8H     PRN Meds:acetaminophen, acetaminophen, diphenhydrAMINE, ibuprofen, ondansetron, oxycodone-acetaminophen, promethazine (PHENERGAN) IVPB     Review of patient's allergies indicates:  No Known Allergies  Objective:     Vital Signs (Most Recent):  Temp: 98.6 °F (37 °C) (04/19/17 2030)  Pulse: 72 (04/19/17 2030)  Resp: 16 (04/19/17 2030)  BP: 123/66 (04/19/17 2030)  SpO2: 100 % (04/19/17 2030) Vital Signs (24h Range):  Temp:  [97.6 °F (36.4 °C)-98.8 °F (37.1 °C)] 98.6 °F (37 °C)  Pulse:  [] 72  Resp:  [12-39] 16  SpO2:  [97 %-100 %] 100 %  BP: (105-131)/(57-80) 123/66     Weight: 56.7 kg (125 lb)  Body mass index is 18.46 kg/(m^2).    Intake/Output - Last 3 Shifts       04/18 0700 - 04/19 0659 04/19 0700 - 04/20 0659 04/20 0700 - 04/21 0659    I.V. (mL/kg)  1500 (26.5)     Total Intake(mL/kg)  1500 (26.5)     Net   +1500             Urine Occurrence  4 x           Physical Exam   Constitutional: She is oriented to person, place, and time. She appears well-developed.   HENT:   Head: Normocephalic.   Eyes: Pupils are equal, round, and reactive to light.   Neck: Neck supple.   No hematoma or erythema surrounding incision   Cardiovascular: Normal rate and regular rhythm.    Pulmonary/Chest: Effort normal. No stridor. No respiratory distress.   Abdominal: She exhibits no distension.   Neurological: She is alert and oriented to person, place, and time.   Skin: Skin is warm and dry.   Psychiatric: She has a normal mood and affect. Her behavior is normal.

## 2017-04-20 NOTE — PLAN OF CARE
Problem: Patient Care Overview  Goal: Plan of Care Review  Outcome: Ongoing (interventions implemented as appropriate)  POC reviewed with patient. VSS. AAOx4. Pain treated with PRN medications. Neck dressing clean, dry, intact. Tolerating diet. Ambulating to the restroom and voiding. Patient remained free from falls and trauma. .

## 2017-04-20 NOTE — OP NOTE
DATE OF PROCEDURE:  04/19/2017    PRIMARY SURGEON:  Laith Rooney M.D.    ASSISTANT SURGEON:  Stanton David M.D.(RES).    PREOPERATIVE DIAGNOSIS:  Right thyroid nodule.    POSTOPERATIVE DIAGNOSIS:  Right thyroid nodule.    PROCEDURE:  Right hemithyroidectomy (right thyroid lobectomy).    ANESTHESIA:  General endotracheal anesthesia.    PROCEDURE IN DETAIL:  The patient underwent informed consent.  The history and   physical examination was reviewed and updated.  The right neck was marked as was   a natural skin crease transversely within the right neck between the thyroid   notch of the thyroid cartilage and sternal notch.  The patient was brought to   the operating room.  She underwent a general endotracheal anesthesia.  The head   and neck were extended with a transverse roll behind her shoulder blades.  Both   arms were tucked.  The anterior neck was prepped and draped in a sterile   fashion.  A 4 cm incision was made on the right neck through the natural skin   crease that had been marked preoperatively from midline to the right side.  The   skin was incised sharply.  The subcutaneous tissue and platysmal layers were   divided transversely with cautery.  Subplatysmal flaps were raised.  A   self-retaining retractor was placed.  The strap muscles were divided   longitudinally in the midline and reflected to the right.  We identified the   right thyroid lobe and nodule.  The right thyroid lobe was rotated   anteromedially keeping the dissection outside of the extracapsular thyroidal   plane.  We took down the terminal branches of middle thyroid vein and the   inferior thyroid artery as they entered the substance of the gland.  This   allowed further anteromedial mobilization of the gland.  We dissected the   superior pole vessels beneath the thyroid capsule to minimize risks to the   external branch of the superior laryngeal nerve.  These branches were taken with   clips and with the Harmonic scalpel  and this allowed further mobilization of   the inferior pole.  There was some nodularity to the inferior pole, which was   elevated from the inferior aspect of the neck and rotated superiorly and then   anteromedially.  We then identified the recurrent laryngeal nerve within the   tracheoesophageal groove in its normal anatomic location.  We identified both   parathyroid glands and displaced the parathyroid tissue and its blood supply   posterolaterally by keeping the dissection along the extracapsular thyroidal   plane.  Remaining attachments of the thyroid lobe to the ligament of Berry and   anterior aspect of the trachea were taken with cautery as the recurrent   laryngeal nerve was identified and displaced posterolaterally to preserve its   integrity.  We then divided the thyroid at the junction of the right lobe with   the isthmus preserving most of the isthmus.  This was done with the Harmonic   scalpel.  The right thyroid lobe was oriented with a short stitch superiorly on   the superior pole of the right thyroid lobe and a long stitch on the isthmus.    It was sent to Pathology for frozen section.  Frozen section did not reveal any   suspicious findings, and therefore we completed the procedure.  The neck was   irrigated.  Estimated blood loss had been minimal.  There was no evidence of   bleeding.  Surgical fibrillar was placed along the right tracheoesophageal   groove.  The strap muscles were reapproximated in the midline with interrupted   3-0 Vicryl suture.  The platysma was closed with a running 3-0 Vicryl suture and   then the skin was closed with a running 4-0 Monocryl subcuticular skin closure.    Mastisol, Steri-Strips, sterile Telfa gauze and Tegaderm dressing were   applied.  Estimated blood loss was minimal.  All needle, instrument and sponge   counts were correct.  The patient was turned over to Anesthesia for extubation   and transport to the recovery area in a satisfactory condition.  There  were no   complications.      RLC/HN  dd: 04/20/2017 07:33:14 (CDT)  td: 04/20/2017 09:04:07 (CDT)  Doc ID   #[id number]  Job ID #[id number]    CC:

## 2017-04-20 NOTE — NURSING
Pt being discharged home. Pt given discharge instructions, prescriptions, and future appointments. Pt verbalized understanding. PIV removed, catheter intact. Pt will leave the unit via wheelchair once transport arrives.

## 2017-04-21 NOTE — DISCHARGE SUMMARY
[facility]  General Surgery  Discharge Summary      Patient Name: Daphnie Vazquez  MRN: [id number]  Admission Date: 4/19/2017  Hospital Length of Stay: 0 days  Discharge Date and Time: 4/20/2017 10:00 AM  Attending Physician: Laith Rooney  Discharging Provider: Pauly Sanchez MD  Primary Care Provider: Primary Doctor No     HPI:   Her history of   right thyroid nodular process dates back to approximately 2007, when she had an   approximate 3-cm right thyroid nodule identified when she was approximately a   freshman in college in 2007. This was consistent more with a simple cystic like  lesion, which was benign on biopsy. 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 She had another repeat   right-sided fine needle aspiration biopsy at that point in 2009, which revealed   an atypia of undetermined significance/FLUS etiology. She followed this   clinically. 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 She also has a right submandibular lymph   node, which is approximately 1.5 cm. She underwent a recent ultrasound on   02/10/2017, which revealed a right-sided thyroid nodule measuring 3.2 cm in   greatest dimension. It was heterogeneous with a cystic component and a   prominent vessel located inferiorly. She also had some benign appearing   left-sided nodules, the largest of which was 1 cm. The left-sided nodules did   not meet criteria for fine needle aspiration biopsy. She underwent repeat fine   needle aspiration biopsy on 02/14/2017, which revealed a follicular lesion of   undetermined significance, again consistent with a FLUS on the right side. I   reviewed the thyroid images with her. We obtained a TSH level today at the date  of consultation on 02/16/2017, which returned at 1.929. She is clinically  and   biochemically euthyroid.    Procedure(s):  THYROIDECTOMY, right alejandra thyroidectomy     Hospital Course:   Daphnie Vazquez is a 27 y.o. female admitted following the above procedure. Her post operative course was uncomplicated and she was discharged the morning of post operative day 1 when she was tolerating a regular diet and pain was controlled with PO pain medication. She will follow up with Dr. Rooney in clinic.    Significant Diagnostic Studies: None    Pending Diagnostic Studies:     None        Final Active Diagnoses:    Diagnosis Date Noted POA    PRINCIPAL PROBLEM:  Thyroid nodule [E04.1] 02/20/2017 Yes      Problems Resolved During this Admission:    Diagnosis Date Noted Date Resolved POA      Discharged Condition: good    Disposition: Home or Self Care    Follow Up:  Follow-up Information     Follow up with Laith Rooney MD In 1 week.    Specialty:  General Surgery    Contact information:    [address] [place] [id number]  [phone]          Patient Instructions:     Diet general     Activity as tolerated     Shower on day dressing removed (No bath)     Call MD for:  temperature >100.4     Call MD for:  persistent nausea and vomiting or diarrhea     Call MD for:  severe uncontrolled pain     Call MD for:  redness, tenderness, or signs of infection (pain, swelling, redness, odor or green/yellow discharge around incision site)     Call MD for:  difficulty breathing or increased cough     Call MD for:  severe persistent headache     Call MD for:  worsening rash     Call MD for:  persistent dizziness, light-headedness, or visual disturbances     Call MD for:  increased confusion or weakness     Remove dressing in 24 hours       Medications:  Reconciled Home Medications:   Discharge Medication List as of 4/20/2017  8:35 AM      START taking these medications    Details   ondansetron (ZOFRAN-ODT) 8 MG TbDL Take 1 tablet (8 mg total) by mouth every 6 (six) hours as needed., Starting  4/20/2017, Until Discontinued, Brenda      oxycodone-acetaminophen (PERCOCET) 5-325 mg per tablet Take 1 tablet by mouth every 4 (four) hours as needed for Pain., Starting 4/20/2017, Until Discontinued, Brenda Sanchez MD  General Surgery  [facility]

## 2017-05-04 DIAGNOSIS — E89.0 STATUS POST THYROIDECTOMY: Primary | ICD-10-CM

## 2017-06-12 ENCOUNTER — LAB VISIT (OUTPATIENT)
Dept: LAB | Facility: HOSPITAL | Age: 27
End: 2017-06-12
Attending: SURGERY
Payer: COMMERCIAL

## 2017-06-12 ENCOUNTER — PATIENT MESSAGE (OUTPATIENT)
Dept: RESEARCH | Facility: HOSPITAL | Age: 27
End: 2017-06-12

## 2017-06-12 DIAGNOSIS — E89.0 STATUS POST THYROIDECTOMY: Primary | ICD-10-CM

## 2017-06-12 DIAGNOSIS — E89.0 STATUS POST THYROIDECTOMY: ICD-10-CM

## 2017-06-12 LAB — TSH SERPL DL<=0.005 MIU/L-ACNC: 3.61 UIU/ML

## 2017-06-12 PROCEDURE — 36415 COLL VENOUS BLD VENIPUNCTURE: CPT

## 2017-06-12 PROCEDURE — 84443 ASSAY THYROID STIM HORMONE: CPT

## 2017-09-06 ENCOUNTER — OFFICE VISIT (OUTPATIENT)
Dept: INTERNAL MEDICINE | Facility: CLINIC | Age: 27
End: 2017-09-06
Payer: COMMERCIAL

## 2017-09-06 VITALS
HEIGHT: 68 IN | HEART RATE: 55 BPM | WEIGHT: 125.25 LBS | BODY MASS INDEX: 18.98 KG/M2 | DIASTOLIC BLOOD PRESSURE: 64 MMHG | SYSTOLIC BLOOD PRESSURE: 108 MMHG

## 2017-09-06 DIAGNOSIS — J45.20 COLD-INDUCED ASTHMA, MILD INTERMITTENT, UNCOMPLICATED: ICD-10-CM

## 2017-09-06 DIAGNOSIS — Z00.00 HEALTHCARE MAINTENANCE: ICD-10-CM

## 2017-09-06 DIAGNOSIS — F90.1 ATTENTION DEFICIT HYPERACTIVITY DISORDER (ADHD), PREDOMINANTLY HYPERACTIVE TYPE: Primary | ICD-10-CM

## 2017-09-06 PROCEDURE — 99999 PR PBB SHADOW E&M-EST. PATIENT-LVL IV: CPT | Mod: PBBFAC,,, | Performed by: INTERNAL MEDICINE

## 2017-09-06 PROCEDURE — 3008F BODY MASS INDEX DOCD: CPT | Mod: S$GLB,,, | Performed by: INTERNAL MEDICINE

## 2017-09-06 PROCEDURE — 99203 OFFICE O/P NEW LOW 30 MIN: CPT | Mod: S$GLB,,, | Performed by: INTERNAL MEDICINE

## 2017-09-06 RX ORDER — ALBUTEROL SULFATE 90 UG/1
2 AEROSOL, METERED RESPIRATORY (INHALATION) EVERY 6 HOURS PRN
Qty: 18 G | Refills: 0 | Status: SHIPPED | OUTPATIENT
Start: 2017-09-06 | End: 2017-09-06 | Stop reason: SDUPTHER

## 2017-09-06 RX ORDER — ALBUTEROL SULFATE 90 UG/1
2 AEROSOL, METERED RESPIRATORY (INHALATION) EVERY 6 HOURS PRN
Qty: 18 G | Refills: 0 | Status: SHIPPED | OUTPATIENT
Start: 2017-09-06 | End: 2019-08-07

## 2017-09-06 NOTE — PROGRESS NOTES
Subjective:       Patient ID: Daphnie Vazquez is a 27 y.o. female.    Chief Complaint: Establish Care and Annual Exam    Ms. Vazquez is a 27y F pt with PMHx of ADHD and partial thyroidectomy 4/2017 for a benign thyroid nodule here to establish care. Pt has a hx of ADHD for which she was on ritalin for 1.5 yrs. 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 However, she has been struggling with difficulty concentrating at work. She is interested in a referral to psych to so she can be restarted on meds. Pt also reports wheezing and chest tightness when she runs in cold weather or indoors with AC. Also has a hx of heart palpitations for which she states she was evaluated by a cardiologist at home. Denies chest pain,  SOB, fatigue, lower extremity edema or cough.  Fam hx is significant for paternal grandmother with DM2, and father with HTN. Pt denies recreational drug or tobacco use. She drinks 3-4 drinks approx once per week. Does not take any meds. Last pap smear was >3yrs ago. Has an implantable contraceptive device placed 3 yrs ago and would like a referral to obgyn. She is originally from [place].      Review of Systems   Constitutional: Negative for activity change, appetite change, chills, diaphoresis, fatigue, fever and unexpected weight change.   HENT: Negative for postnasal drip, sinus pain, sneezing and trouble swallowing.    Eyes: Negative for visual disturbance.   Respiratory: Positive for chest tightness and wheezing. Negative for cough and shortness of breath.    Cardiovascular: Positive for palpitations. Negative for chest pain and leg swelling.   Gastrointestinal: Negative for abdominal pain, constipation, diarrhea, nausea and vomiting.   Genitourinary: Negative for dysuria and hematuria.   Musculoskeletal: Negative for gait problem and joint swelling.   Skin: Negative for color change.   Neurological: Negative for weakness and  headaches.   Psychiatric/Behavioral: Negative for confusion and sleep disturbance.       Objective:      Physical Exam   Constitutional: She appears well-developed and well-nourished. No distress.   HENT:   Head: Normocephalic and atraumatic.   Mouth/Throat: Oropharynx is clear and moist.   Eyes: Conjunctivae and EOM are normal. Pupils are equal, round, and reactive to light.   Neck: Normal range of motion. Neck supple.   Cardiovascular: Normal rate, regular rhythm and normal heart sounds.    No murmur heard.  Pulmonary/Chest: Effort normal and breath sounds normal. No respiratory distress. She has no wheezes.   Abdominal: Soft. Bowel sounds are normal. She exhibits no distension. There is no tenderness.   Musculoskeletal: Normal range of motion. She exhibits no edema.   Lymphadenopathy:     She has no cervical adenopathy.   Skin: Skin is warm and dry. She is not diaphoretic.   Psychiatric: She has a normal mood and affect. Her behavior is normal. Judgment and thought content normal.       Assessment:       1. Attention deficit hyperactivity disorder (ADHD), predominantly hyperactive type    2. Mild intermittent asthma without complication    3. Healthcare maintenance        Plan:       Attention deficit hyperactivity disorder (ADHD), predominantly hyperactive type  -     Ambulatory consult to Psychiatry  -     patient to bring in behavioral study results for confirmation of ADHD diagnosis    Cold-induced asthma  -hx of wheezing and chest tightness when running in cold temps. Will give trial of albuterol inhaler for symptomatic relief.  -     albuterol 90 mcg/actuation inhaler; Inhale 2 puffs into the lungs every 6 (six) hours as needed for Wheezing. Rescue  Dispense: 18 g; Refill: 0  -     inhalation spacing device (BREATHERITE SPACER-MASK,ADULT); Use as directed for inhalation.  Dispense: 1 Device; Refill: 0    Healthcare maintenance  -     Ambulatory consult to Obstetrics / Gynecology      Case discussed with   Jaimee.  Brittney Adam PGY-2  Internal Medicine    RTC in 1yr or as needed.

## 2018-03-05 ENCOUNTER — OFFICE VISIT (OUTPATIENT)
Dept: OBSTETRICS AND GYNECOLOGY | Facility: CLINIC | Age: 28
End: 2018-03-05
Attending: OBSTETRICS & GYNECOLOGY
Payer: COMMERCIAL

## 2018-03-05 VITALS
DIASTOLIC BLOOD PRESSURE: 50 MMHG | HEIGHT: 69 IN | BODY MASS INDEX: 18.32 KG/M2 | WEIGHT: 123.69 LBS | SYSTOLIC BLOOD PRESSURE: 100 MMHG

## 2018-03-05 DIAGNOSIS — Z11.3 SCREENING FOR VENEREAL DISEASE: ICD-10-CM

## 2018-03-05 DIAGNOSIS — Z01.419 WELL FEMALE EXAM WITH ROUTINE GYNECOLOGICAL EXAM: Primary | ICD-10-CM

## 2018-03-05 PROCEDURE — 88175 CYTOPATH C/V AUTO FLUID REDO: CPT | Performed by: PATHOLOGY

## 2018-03-05 PROCEDURE — 99385 PREV VISIT NEW AGE 18-39: CPT | Mod: S$GLB,,, | Performed by: OBSTETRICS & GYNECOLOGY

## 2018-03-05 PROCEDURE — 99999 PR PBB SHADOW E&M-EST. PATIENT-LVL IV: CPT | Mod: PBBFAC,,, | Performed by: OBSTETRICS & GYNECOLOGY

## 2018-03-05 PROCEDURE — 88141 CYTOPATH C/V INTERPRET: CPT | Mod: ,,, | Performed by: PATHOLOGY

## 2018-03-05 PROCEDURE — 87491 CHLMYD TRACH DNA AMP PROBE: CPT

## 2018-03-06 LAB
C TRACH DNA SPEC QL NAA+PROBE: NOT DETECTED
N GONORRHOEA DNA SPEC QL NAA+PROBE: NOT DETECTED

## 2018-03-06 NOTE — PROGRESS NOTES
SUBJECTIVE:   28 y.o. female   for routine gyn exam. Patient's last menstrual period was 2018..  She has no unusual complaints. Has Nexplanon.  Desires IUD.  Desires STD screen.         Past Medical History:   Diagnosis Date    Abnormal Pap smear of cervix     LEEP    ADHD (attention deficit hyperactivity disorder)     Raynaud phenomenon     Thyroid disease     Vaccine for human papilloma virus (HPV) types 6, 11, 16, and 18 administered      Past Surgical History:   Procedure Laterality Date    BREAST SURGERY      augmentation    CERVICAL BIOPSY  W/ LOOP ELECTRODE EXCISION  age 17     Social History     Social History    Marital status: Single     Spouse name: N/A    Number of children: N/A    Years of education: N/A     Occupational History    Not on file.     Social History Main Topics    Smoking status: Never Smoker    Smokeless tobacco: Never Used    Alcohol use Yes      Comment: occasionally     Drug use: No    Sexual activity: Yes     Partners: Male     Birth control/ protection: Implant      Comment: Nexplanon      Other Topics Concern    Not on file     Social History Narrative    No narrative on file     Family History   Problem Relation Age of Onset    Hypertension Father     Macular degeneration Maternal Grandfather     Breast cancer Neg Hx     Colon cancer Neg Hx     Ovarian cancer Neg Hx      OB History    Para Term  AB Living   0 0 0 0 0 0   SAB TAB Ectopic Multiple Live Births   0 0 0 0 0               Current Outpatient Prescriptions   Medication Sig Dispense Refill    albuterol 90 mcg/actuation inhaler Inhale 2 puffs into the lungs every 6 (six) hours as needed for Wheezing. Rescue 18 g 0    etonogestrel (NEXPLANON) 68 mg Impl by Subdermal route.      inhalation spacing device (BREATHERITE SPACER-MASK,ADULT) Use as directed for inhalation. 1 Device 0     No current facility-administered medications for this visit.      Allergies: Patient  "has no known allergies.     ROS:  Constitutional: no weight loss, weight gain, fever, fatigue  Eyes:  No vision changes, glasses/contacts  ENT/Mouth: No ulcers, sinus problems, ears ringing, headache  Cardiovascular: No inability to lie flat, chest pain, exercise intolerance, swelling, heart palpitations  Respiratory: No wheezing, coughing blood, shortness of breath, or cough  Gastrointestinal: No diarrhea, bloody stool, nausea/vomiting, constipation, gas, hemorrhoids  Genitourinary: No blood in urine, painful urination, urgency of urination, frequency of urination, incomplete emptying, incontinence, abnormal bleeding, painful periods, heavy periods, vaginal discharge, vaginal odor, painful intercourse, sexual problems, bleeding after intercourse.  Musculoskeletal: No muscle weakness  Skin/Breast: No painful breasts, nipple discharge, masses, rash, ulcers  Neurological: No passing out, seizures, numbness, headache  Endocrine: No diabetes, hypothyroid, hyperthyroid, hot flashes, hair loss, abnormal hair growth, ance  Psychiatric: No depression, crying  Hematologic: No bruises, bleeding, swollen lymph nodes, anemia.      OBJECTIVE:   The patient appears well, alert, oriented x 3, in no distress.  BP (!) 100/50 (BP Location: Right arm, Patient Position: Sitting, BP Method: Medium (Manual))   Ht 5' 9" (1.753 m)   Wt 56.1 kg (123 lb 10.9 oz)   LMP 02/02/2018   BMI 18.26 kg/m²   NECK: no thyromegaly, trachea midline  SKIN: no acne, striae, hirsutism  CHEST: CTAB  CV: RRR  BREAST EXAM: breasts appear normal, no suspicious masses, no skin or nipple changes or axillary nodes  ABDOMEN: no hernias, masses, or hepatosplenomegaly  GENITALIA: normal external genitalia, no erythema, no discharge  URETHRA: normal urethra, normal urethral meatus  VAGINA: Normal  CERVIX: no lesions or cervical motion tenderness  UTERUS: normal size, contour, position, consistency, mobility, non-tender  ADNEXA: no mass, fullness, " tenderness      ASSESSMENT:   1. Well female exam with routine gynecological exam  Liquid-based pap smear, screening   2. Screening for venereal disease  C. trachomatis/N. gonorrhoeae by AMP DNA Cervix       PLAN:   Orders Placed This Encounter    C. trachomatis/N. gonorrhoeae by AMP DNA Cervix    Liquid-based pap smear, screening     Discussed IUD, Neplanon.  Pre-auth Mirena.  Discussed pre-insertion meds, etc  Return to clinic in 1 year

## 2018-03-09 ENCOUNTER — TELEPHONE (OUTPATIENT)
Dept: OBSTETRICS AND GYNECOLOGY | Facility: CLINIC | Age: 28
End: 2018-03-09

## 2018-03-09 NOTE — TELEPHONE ENCOUNTER
Called pt to schedule colposcopy. Offered pt appt on 3/21/2018. PT refused and said she needs to come in next week. Told pt that I would call her back after verifying that Dr. Garza's appt's in the late afternoon are available. Pt communicated understanding.

## 2018-03-09 NOTE — TELEPHONE ENCOUNTER
----- Message from Geraldine Sacha sent at 3/9/2018  2:37 PM CST -----  _x 1st Request  _  2nd Request  _  3rd Request        Who: piter    Why: pt. Confirming appt. On 03/14/18 at 10:00a.m.pt. Stating she can come in    What Number to Call Back:[phone]    When to Expect a call back: (Before the end of the day)   -- if the call is after 12:00, the call back will be tomorrow.

## 2018-03-09 NOTE — TELEPHONE ENCOUNTER
Scheduled pt's colpo for 3/14/2018 at 10:00 AM. Called pt and informed her of the appt time. Pt communicated understanding.

## 2018-03-14 ENCOUNTER — PROCEDURE VISIT (OUTPATIENT)
Dept: OBSTETRICS AND GYNECOLOGY | Facility: CLINIC | Age: 28
End: 2018-03-14
Attending: OBSTETRICS & GYNECOLOGY
Payer: COMMERCIAL

## 2018-03-14 VITALS
BODY MASS INDEX: 20.8 KG/M2 | HEIGHT: 66 IN | DIASTOLIC BLOOD PRESSURE: 70 MMHG | WEIGHT: 129.44 LBS | SYSTOLIC BLOOD PRESSURE: 100 MMHG

## 2018-03-14 DIAGNOSIS — Z01.812 PRE-PROCEDURE LAB EXAM: ICD-10-CM

## 2018-03-14 DIAGNOSIS — Z87.42 HISTORY OF ABNORMAL CERVICAL PAP SMEAR: ICD-10-CM

## 2018-03-14 DIAGNOSIS — R87.611 PAP SMEAR OF CERVIX WITH ASCUS, CANNOT EXCLUDE HGSIL: Primary | ICD-10-CM

## 2018-03-14 LAB
B-HCG UR QL: NEGATIVE
CTP QC/QA: YES

## 2018-03-14 PROCEDURE — 88305 TISSUE EXAM BY PATHOLOGIST: CPT | Mod: 26,,, | Performed by: PATHOLOGY

## 2018-03-14 PROCEDURE — 88305 TISSUE EXAM BY PATHOLOGIST: CPT | Performed by: PATHOLOGY

## 2018-03-14 PROCEDURE — 81025 URINE PREGNANCY TEST: CPT | Mod: S$GLB,,, | Performed by: OBSTETRICS & GYNECOLOGY

## 2018-03-14 PROCEDURE — 57454 BX/CURETT OF CERVIX W/SCOPE: CPT | Mod: S$GLB,,, | Performed by: OBSTETRICS & GYNECOLOGY

## 2018-03-14 NOTE — PROCEDURES
Colposcopy  Date/Time: 3/14/2018 10:57 AM  Performed by: TERENCE CH  Authorized by: TERENCE CH     Consent Done?:  Yes (Written)  Assistants?: Yes      Colposcopy Site:  Cervix  Position:  Supine  Acrowhite Lesion? Yes (12-2, thick WE at 6, faint mosaicism at 2)    Atypical Vessels: No    Transformation Zone Adequate?: Yes    Biopsy?: Yes         Location:  Cervix ((2 00, 12 00 and 6 00))  ECC Performed?: Yes    LEEP Performed?: No     Patient tolerated the procedure well with no immediate complications.   Patient was discharged and will follow up if any complications occur

## 2018-03-16 ENCOUNTER — TELEPHONE (OUTPATIENT)
Dept: OBSTETRICS AND GYNECOLOGY | Facility: CLINIC | Age: 28
End: 2018-03-16

## 2018-04-23 ENCOUNTER — TELEPHONE (OUTPATIENT)
Dept: OBSTETRICS AND GYNECOLOGY | Facility: CLINIC | Age: 28
End: 2018-04-23

## 2018-04-23 NOTE — TELEPHONE ENCOUNTER
----- Message from Lissette Tejeda sent at 4/23/2018  1:53 PM CDT -----  Contact: Daphnie  Name of Who is Calling: Daphnie      What is the request in detail: Patient is requesting a call back to schedule her surgery       Can the clinic reply by [facility]: No      [facility] [phone]

## 2018-04-24 ENCOUNTER — TELEPHONE (OUTPATIENT)
Dept: OBSTETRICS AND GYNECOLOGY | Facility: CLINIC | Age: 28
End: 2018-04-24

## 2018-04-24 NOTE — TELEPHONE ENCOUNTER
Patient called on 04/23/2018 to notify provider she could have the Leep done in the OR 04/25/2018 @ Noon. I called patient today to confirm-Ms George decline procedure and will need to call back when she is on a different rotation. She did not inform her attending so she could be excused. Advised patient to call the office when she is available for scheduling. She verbalized understanding

## 2018-05-01 ENCOUNTER — PATIENT MESSAGE (OUTPATIENT)
Dept: OBSTETRICS AND GYNECOLOGY | Facility: CLINIC | Age: 28
End: 2018-05-01

## 2018-05-10 ENCOUNTER — PATIENT MESSAGE (OUTPATIENT)
Dept: OBSTETRICS AND GYNECOLOGY | Facility: CLINIC | Age: 28
End: 2018-05-10

## 2018-06-26 ENCOUNTER — PROCEDURE VISIT (OUTPATIENT)
Dept: OBSTETRICS AND GYNECOLOGY | Facility: CLINIC | Age: 28
End: 2018-06-26
Payer: COMMERCIAL

## 2018-06-26 VITALS — DIASTOLIC BLOOD PRESSURE: 72 MMHG | SYSTOLIC BLOOD PRESSURE: 98 MMHG

## 2018-06-26 DIAGNOSIS — D06.9 SEVERE DYSPLASIA OF CERVIX: Primary | ICD-10-CM

## 2018-06-26 PROCEDURE — 88305 TISSUE EXAM BY PATHOLOGIST: CPT | Performed by: PATHOLOGY

## 2018-06-26 PROCEDURE — 81025 URINE PREGNANCY TEST: CPT | Mod: S$GLB,,, | Performed by: OBSTETRICS & GYNECOLOGY

## 2018-06-26 PROCEDURE — 88305 TISSUE EXAM BY PATHOLOGIST: CPT | Mod: 26,,, | Performed by: PATHOLOGY

## 2018-06-26 PROCEDURE — 57461 CONZ OF CERVIX W/SCOPE LEEP: CPT | Mod: S$GLB,,, | Performed by: OBSTETRICS & GYNECOLOGY

## 2018-06-26 PROCEDURE — 88307 TISSUE EXAM BY PATHOLOGIST: CPT | Mod: 26,,, | Performed by: PATHOLOGY

## 2018-06-28 LAB
B-HCG UR QL: NEGATIVE
CTP QC/QA: YES

## 2018-06-28 NOTE — PROCEDURES
Procedures     Daphnie Vazquez 28 y.o.  presents for leep for severe dysplasia.  UPT negative,    PROCEDURE:     PRE- LEEP PROCEDURE COUNSELING:  Risks of infection, bleeding, pain, injury to adjacent organs as well as future cervicalincompetence.  That this procedure does not guarantee to completely remove all abnormal cells and that dysplasia may reoccur in the future.  Alternatives to the LEEP procedure were discussed and the patient agrees to proceed.    PROCEDURE:  TIME OUT PERFORMED.  The cervix and transformation zone were visualized with a speculum and a local block was obtained with 10 cc of lidocaine without epinephrine.  The entire transformation zone was excised.  The base was cauterized with a ball cautery.  Monsels solution was applied to the base to obtain hemostasis and the speculum removed.  The specimen was placed in formalin and sent to Pathology for a Histopathologic diagnosis.  The patient tolerated the procedure well.    ASSESSMENT:  1.Cervical dysplasia. 622.1.    POST LEEP PROCEDURE COUNSELING:  Manage post LEEP cramping with NSAIDs or Tylenol or Rx per Medcard.  Avoid anything in vagina (intercourse, douching, tampons) two weeks after the Procedure.  Expect a watery grey to yellow vaginal discharge for several weeks.  Limit activity for 2 weeks.  Report bleeding heavier than a period, worsening pain, fever > 101.0 F, or foul-smelling vaginal discharge.  Importance of follow-up stressed.    Counseling lasted approximately 15 minutes and all her questions were answered.    FOLLOW-UP based on pathology results.

## 2018-07-03 ENCOUNTER — PATIENT MESSAGE (OUTPATIENT)
Dept: OBSTETRICS AND GYNECOLOGY | Facility: CLINIC | Age: 28
End: 2018-07-03

## 2018-07-16 ENCOUNTER — TELEPHONE (OUTPATIENT)
Dept: OBSTETRICS AND GYNECOLOGY | Facility: CLINIC | Age: 28
End: 2018-07-16

## 2018-07-16 NOTE — TELEPHONE ENCOUNTER
----- Message from Miriam Saravia LPN sent at 7/16/2018 10:17 AM CDT -----  Chris Eller,     The pathology came back from the LEEP, and everything looks fine (consistent with what your biopsies showed). The ECC was benign. The specimen at 12:00 showed severe dysplasia, but the margin was clear. The specimen at 6:00 showed moderate dysplasia (YARELI II), but they said that the clear margin was <1 mm. I would like to be conservative and treat this like a positive margin. We usually repeat the pap in 4-6 months in this case. If the margin had been clear, we would repeat the pap and ECC in 12 months. I did cauterize the base, so theoretically even with the potentially positive margin, the dysplasia should be destroyed/removed.     Please let me know if you have any questions! Hope you are doing well.     Darlene     FINAL PATHOLOGIC DIAGNOSIS   1. Uterus, posterior cervix, cone biopsy:   -Focal high-grade squamous intraepithelial lesion (YARELI-2), see comment.   -Transformation zone not present.   Comment: The high-grade squamous intraepithelial lesion is less than 1 mm from the cauterized surgical margin.   2. Uterus, cervix, 9:00, biopsy:   -Squamous mucosa negative for dysplasia.   -Transformation zone not present.   3. Uterus, anterior cervix, cone biopsy:   -Transformation zone with high-grade squamous intraepithelial lesion (YARELI-3).   -Margins negative.    [facility] [place]     Patient Portal User Last Read On  Daphnie Vazquez Not Read

## 2018-07-16 NOTE — TELEPHONE ENCOUNTER
"Called patient as she had not read her PhotoBox message with the [facility] pathology. Reviewed report with patient and recommendations. Patient reports that last night had some "purulent vaginal discharge" last week that has now resolved. No fever or chills. Currently on her cycle. Reassurance given, if returns, patient to schedule appt with me.   "

## 2018-08-27 ENCOUNTER — TELEPHONE (OUTPATIENT)
Dept: OPTOMETRY | Facility: CLINIC | Age: 28
End: 2018-08-27

## 2018-08-27 NOTE — TELEPHONE ENCOUNTER
EYEMED BENEFITS as of 8/28/18:    Member Name: BINH MANUEL  Member ID: [id number]  Social Security Number: [id number]  YOB: 1990  Address: [address] [place] [address]  Phone Number: [phone]  Gender: Female  Responsible Member: BINH MANUEL    Network: [id number]  Group: [employer] Vision Plan ([id number])  Benefit Level: 1  Service Eligibility  If you have more than one location under your User ID, choose a location then use the drop-down menu to choose the provider who is rendering services.     From the tabs below, select the type of benefit you will be providing, then check the box(es) next to the applicable service(s). You will not receive an authorization for this member. Instead, simply click Submit Claim to start the claim process.    Routine refers to routine vision benefits, including eye exams, lenses, frames and contact lenses.   Medical refers to benefits for medical eye care services, including diabetic eye care.   Additional Purchases will calculate member payments on additional pairs of glasses and other materials members receive discounts on so you can determine member out-of-pocket costs.  To learn more, download our Member Benefits Display Job Aid.          Location [address] [facility], [address] ([id number]) (Change)  Provider   Date of Service: 08/28/2018  Routine Medical Additional Purchase   is Eligible? Member Eligible As Of Service Frequency   Exam Yes 01/01/2018 Once every calendar year   Lenses Yes 01/01/2018 Once every calendar year   Frames Yes 01/01/2018 Once every 2 calendar years   Contact Lenses Yes 01/01/2018 Once every calendar year   Contact Lens Fit & Follow-up Yes 01/01/2018 Unlimited  You will not receive an authorization for routine vision services for this member; simply click Submit Claim to start the claim process.    This information is based on data available in our system today and does not take into account future  changes to the member's plan. Please verify eligibility immediately prior to receiving services.    Service Restrictions  Plan allows the member to receive either contacts and frame, or frame and lens services  Related Members  Below are other members covered under the same subscriber ID. Select the member name to start a claim.    Member Name Group Name Member ID SS#   BINH MANUEL Vision Plan [id number] [phone]  Showing 1 family member  Member Benefits  Select the link below to view benefit details for this member. (Your browser must be java script-enabled to use this function.)    Note: you will not receive an authorization for this member. To complete the claim later, select Claims from the navigation window.     Hide Benefits    Routine In Network  Exam Services   Exam with Dilation as Necessary $10 [employer]  Retinal Imaging Up to $39  Contact Lens Fit and Follow-Up   Fit and Follow-up - Standard Up to $40  Fit and Follow-up - Premium 10% off Retail Price  Frames   Frame $0 Copay; 20% off balance over $130 Allowance  Lenses   Single Vision $15 [employer]  Bifocal $15 Copay  Trifocal $15 Copay  Lenticular $15 Copay  Progressive - Standard $30 Copay  Progressive - Premium $55 Copay  Lens Options   Anti Reflective Coating - Standard $45  Anti Reflective Coating - Premium Tier 1 $57  Anti Reflective Coating - Premium Tier 2 $68  Anti Reflective Coating - Premium Tier 3 20% off Retail Price  Photochromic - Plastic $75  Polycarbonate - Standard - age 19 and over $10 Copay  Polycarbonate - Standard - under age 19 $0 Copay  Scratch Coating - Standard Plastic $10 Copay  Tint - Solid or Gradient $15  UV Treatment $10 Copay  All Other Lens Options 20% off Retail Price  Contact Lenses   Contacts - Conventional $0 Copay; 15% off balance over $130 Allowance  Contacts - Disposable $0 Copay; 100% of balance over $130 Allowance  Contacts - Medically Necessary $0 Copay  Medical In Network  Medical Office Visit  and Services   Office Visit - Medical Follow Up Exam $0 Copay  Retinal Imaging $0 Copay  Extended Ophthalmoscopy $0 Copay  Gonioscopy $0 Copay  Scanning Laser Imaging Posterior Segment $0 Copay  Additional Purchase In Network  Frames   Frame 20% off Retail Price  Lenses   Lenses 20% off Retail Price  Lens Options   Lens Options 20% off Retail Price  Packages   Frame, Lens and Lens Options Purchased as Complete Pair 40% off Retail Price  Contact Lenses   Contacts - Conventional 15% off Retail Price

## 2018-08-28 ENCOUNTER — OFFICE VISIT (OUTPATIENT)
Dept: OPTOMETRY | Facility: CLINIC | Age: 28
End: 2018-08-28

## 2018-08-28 ENCOUNTER — OFFICE VISIT (OUTPATIENT)
Dept: OPTOMETRY | Facility: CLINIC | Age: 28
End: 2018-08-28
Payer: COMMERCIAL

## 2018-08-28 ENCOUNTER — OFFICE VISIT (OUTPATIENT)
Dept: SURGERY | Facility: CLINIC | Age: 28
End: 2018-08-28
Payer: COMMERCIAL

## 2018-08-28 DIAGNOSIS — Z46.0 FITTING AND ADJUSTMENT OF SPECTACLES AND CONTACT LENSES: ICD-10-CM

## 2018-08-28 DIAGNOSIS — L98.9 SKIN LESION OF RIGHT ARM: Primary | ICD-10-CM

## 2018-08-28 DIAGNOSIS — H52.13 MYOPIA, BILATERAL: Primary | ICD-10-CM

## 2018-08-28 DIAGNOSIS — Z46.0 FITTING AND ADJUSTMENT OF SPECTACLES AND CONTACT LENSES: Primary | ICD-10-CM

## 2018-08-28 PROCEDURE — 92015 DETERMINE REFRACTIVE STATE: CPT | Mod: S$GLB,,, | Performed by: OPTOMETRIST

## 2018-08-28 PROCEDURE — 88305 TISSUE EXAM BY PATHOLOGIST: CPT | Performed by: PATHOLOGY

## 2018-08-28 PROCEDURE — 92012 INTRM OPH EXAM EST PATIENT: CPT | Mod: S$GLB,,, | Performed by: OPTOMETRIST

## 2018-08-28 PROCEDURE — 99213 OFFICE O/P EST LOW 20 MIN: CPT | Mod: 25,,, | Performed by: SURGERY

## 2018-08-28 PROCEDURE — 99999 PR PBB SHADOW E&M-EST. PATIENT-LVL II: CPT | Mod: PBBFAC,,, | Performed by: OPTOMETRIST

## 2018-08-28 PROCEDURE — 99999 PR PBB SHADOW E&M-EST. PATIENT-LVL II: CPT | Mod: PBBFAC,,, | Performed by: SURGERY

## 2018-08-28 PROCEDURE — 99999 PR PBB SHADOW E&M-EST. PATIENT-LVL I: CPT | Mod: PBBFAC,,, | Performed by: OPTOMETRIST

## 2018-08-28 PROCEDURE — 88305 TISSUE EXAM BY PATHOLOGIST: CPT | Mod: 26,,, | Performed by: PATHOLOGY

## 2018-08-28 PROCEDURE — 11100 EXC, BENIGN LESION: CPT | Mod: S$GLB,,, | Performed by: SURGERY

## 2018-08-28 PROCEDURE — 92310 CONTACT LENS FITTING OU: CPT | Mod: ,,, | Performed by: OPTOMETRIST

## 2018-08-28 NOTE — PROGRESS NOTES
Subjective:       Patient ID: Daphnie Vazquez is a 28 y.o. female.    Chief Complaint: right arm lesion  HPI Right arm lesion present for over a month. Growing. No other changes. Would like it removed.    Review of Systems   Constitutional: Negative for activity change, appetite change, chills and fever.   HENT: Negative for congestion and trouble swallowing.    Eyes: Negative for visual disturbance.   Respiratory: Negative for cough and shortness of breath.    Cardiovascular: Negative for chest pain and leg swelling.   Gastrointestinal: Negative for abdominal distention, abdominal pain, constipation, diarrhea, nausea and vomiting.   Endocrine: Negative for cold intolerance and heat intolerance.   Genitourinary: Negative for difficulty urinating and dysuria.   Musculoskeletal: Negative for arthralgias and back pain.   Skin: Negative for rash and wound.   Neurological: Negative for dizziness and headaches.   Psychiatric/Behavioral: Negative for agitation and behavioral problems.       Objective:      Physical Exam   Constitutional: She is oriented to person, place, and time. She appears well-developed and well-nourished. No distress.   Cardiovascular: Normal rate and regular rhythm. Exam reveals no gallop and no friction rub.   No murmur heard.  Pulmonary/Chest: Effort normal and breath sounds normal. No respiratory distress. She has no wheezes. She has no rales.   Abdominal: Soft. Bowel sounds are normal.   Musculoskeletal: Normal range of motion. She exhibits no edema.   Neurological: She is alert and oriented to person, place, and time.   Skin: Skin is warm and dry.   Right forearm pale colored raised lesion.   Psychiatric: She has a normal mood and affect. Her behavior is normal.       Assessment:       1. Skin lesion of right arm        Plan:       Recommend punch bx. See procedure note.

## 2018-08-28 NOTE — PROGRESS NOTES
Assessment /Plan     For exam results, see Encounter Report.    Fitting and adjustment of spectacles and contact lenses                   Contact lens exam done, see exam of same date for documentation.

## 2018-08-28 NOTE — PROCEDURES
"Exc, Benign Lesion  Date/Time: 8/28/2018 3:12 PM  Performed by: Joelle Flores MD  Authorized by: Joelle Flores MD     Consent Done?:  Yes (Verbal)  Time out: Immediately prior to procedure a "time out" was called to verify the correct patient, procedure, equipment, support staff and site/side marked as required.    INDICATIONS:: Lesion.  LOCATION:  Body area:  Lower arm / wristright    PREP:  Patient was prepped and draped in the normal sterile fashion.Position:  Supine    ANESTHESIA:  Anesthesia:  Local infiltration  Local anesthetic:  Lidocaine 2% with epinephrine    PROCEDURE DETAILS:  Excision type:  Skin  Malignancy:  Benign  Excision size (cm):  0.4  Scalpel size: 4mm punch.  Specimens?: Yes    Specimens submitted to pathology.  Hemostasis was obtained.  Estimated blood loss (cc):  1  Wound closure:  Simple  Wound repair size (cm):  0.4  Sutures:  Other (comments) (4-0 nylon)  Sterile dressings:  Telfa gauze and Tegaderm  Post-op diagnosis: Same as pre-op diagnosis.  Needle, instrument, and sponge counts were correct.  Patient tolerated the procedure well with no immediate complications.  Post-operative instructions were provided for the patient.  Patient was discharged and will follow up for wound check and pathology results.      "

## 2018-10-23 ENCOUNTER — PATIENT MESSAGE (OUTPATIENT)
Dept: OBSTETRICS AND GYNECOLOGY | Facility: CLINIC | Age: 28
End: 2018-10-23

## 2018-11-27 DIAGNOSIS — N63.10 BREAST MASS, RIGHT: Primary | ICD-10-CM

## 2018-11-28 ENCOUNTER — TELEPHONE (OUTPATIENT)
Dept: OBSTETRICS AND GYNECOLOGY | Facility: CLINIC | Age: 28
End: 2018-11-28

## 2018-11-28 DIAGNOSIS — Z30.46 ENCOUNTER FOR SURVEILLANCE OF IMPLANTABLE SUBDERMAL CONTRACEPTIVE: Primary | ICD-10-CM

## 2018-11-28 NOTE — TELEPHONE ENCOUNTER
----- Message from Veronica Brewer sent at 11/28/2018  3:24 PM CST -----  Name of Who is Calling: #BINH MANUEL [[id number]]      What is the request in detail: Pt would like to renew her nexplanon.       Can the clinic reply by [facility]:    No       What Number [facility] Back [facility]: [phone]##

## 2019-01-11 ENCOUNTER — HOSPITAL ENCOUNTER (OUTPATIENT)
Dept: RADIOLOGY | Facility: HOSPITAL | Age: 29
Discharge: HOME | End: 2019-01-11
Attending: SURGERY
Payer: COMMERCIAL

## 2019-01-11 DIAGNOSIS — N63.10 BREAST MASS, RIGHT: ICD-10-CM

## 2019-01-11 DIAGNOSIS — N63.10 BREAST MASS, RIGHT: Primary | ICD-10-CM

## 2019-01-11 PROCEDURE — 76642 US BREAST RIGHT LIMITED: ICD-10-PCS | Mod: 26,RT,, | Performed by: RADIOLOGY

## 2019-01-11 PROCEDURE — 76642 ULTRASOUND BREAST LIMITED: CPT | Mod: TC,PO,RT

## 2019-01-11 PROCEDURE — 76642 ULTRASOUND BREAST LIMITED: CPT | Mod: 26,RT,, | Performed by: RADIOLOGY

## 2019-01-14 ENCOUNTER — TELEPHONE (OUTPATIENT)
Dept: ELECTROPHYSIOLOGY | Facility: CLINIC | Age: 29
End: 2019-01-14

## 2019-01-14 DIAGNOSIS — R00.2 PALPITATIONS: Primary | ICD-10-CM

## 2019-03-15 ENCOUNTER — PATIENT MESSAGE (OUTPATIENT)
Dept: OBSTETRICS AND GYNECOLOGY | Facility: CLINIC | Age: 29
End: 2019-03-15

## 2019-03-15 DIAGNOSIS — Z30.9 ENCOUNTER FOR CONTRACEPTIVE MANAGEMENT, UNSPECIFIED TYPE: Primary | ICD-10-CM

## 2019-03-22 ENCOUNTER — TELEPHONE (OUTPATIENT)
Dept: OBSTETRICS AND GYNECOLOGY | Facility: CLINIC | Age: 29
End: 2019-03-22

## 2019-04-04 ENCOUNTER — PROCEDURE VISIT (OUTPATIENT)
Dept: OBSTETRICS AND GYNECOLOGY | Facility: CLINIC | Age: 29
End: 2019-04-04
Payer: COMMERCIAL

## 2019-04-04 VITALS
DIASTOLIC BLOOD PRESSURE: 58 MMHG | HEIGHT: 68 IN | SYSTOLIC BLOOD PRESSURE: 100 MMHG | BODY MASS INDEX: 19.32 KG/M2 | WEIGHT: 127.44 LBS

## 2019-04-04 DIAGNOSIS — D06.9 CIN III (CERVICAL INTRAEPITHELIAL NEOPLASIA GRADE III) WITH SEVERE DYSPLASIA: ICD-10-CM

## 2019-04-04 DIAGNOSIS — Z30.017 ENCOUNTER FOR INITIAL PRESCRIPTION OF IMPLANTABLE SUBDERMAL CONTRACEPTIVE: ICD-10-CM

## 2019-04-04 DIAGNOSIS — Z01.419 WELL WOMAN EXAM WITH ROUTINE GYNECOLOGICAL EXAM: Primary | ICD-10-CM

## 2019-04-04 PROCEDURE — 99395 PREV VISIT EST AGE 18-39: CPT | Mod: 25,S$GLB,, | Performed by: OBSTETRICS & GYNECOLOGY

## 2019-04-04 PROCEDURE — 87624 HPV HI-RISK TYP POOLED RSLT: CPT

## 2019-04-04 PROCEDURE — 88305 TISSUE EXAM BY PATHOLOGIST: CPT | Performed by: PATHOLOGY

## 2019-04-04 PROCEDURE — 88305 TISSUE SPECIMEN TO PATHOLOGY, OBSTETRICS/GYNECOLOGY: ICD-10-PCS | Mod: 26,,, | Performed by: PATHOLOGY

## 2019-04-04 PROCEDURE — 99395 PR PREVENTIVE VISIT,EST,18-39: ICD-10-PCS | Mod: 25,S$GLB,, | Performed by: OBSTETRICS & GYNECOLOGY

## 2019-04-04 PROCEDURE — 88305 TISSUE EXAM BY PATHOLOGIST: CPT | Mod: 26,,, | Performed by: PATHOLOGY

## 2019-04-04 PROCEDURE — 11983 PR REMOVAL W/ REINSERT DRUG IMPLANT DEVICE: ICD-10-PCS | Mod: S$GLB,,, | Performed by: OBSTETRICS & GYNECOLOGY

## 2019-04-04 PROCEDURE — 88175 CYTOPATH C/V AUTO FLUID REDO: CPT

## 2019-04-04 PROCEDURE — 11983 REMOVE/INSERT DRUG IMPLANT: CPT | Mod: S$GLB,,, | Performed by: OBSTETRICS & GYNECOLOGY

## 2019-04-04 RX ORDER — ESTRADIOL 2 MG/1
2 TABLET ORAL DAILY
Qty: 30 TABLET | Refills: 1 | Status: SHIPPED | OUTPATIENT
Start: 2019-04-04 | End: 2019-04-04 | Stop reason: SDUPTHER

## 2019-04-04 RX ORDER — ESTRADIOL 2 MG/1
2 TABLET ORAL DAILY
Qty: 30 TABLET | Refills: 1 | Status: SHIPPED | OUTPATIENT
Start: 2019-04-04 | End: 2019-08-07

## 2019-04-05 NOTE — PROCEDURES
Daphnie Vazquez is a 29 y.o. female  presents for Nexplanon removal secondary to device expiration. Patient desires insertion of Nexplanon device, see separate procedure note.    Nexplanon palpated through the skin. Area wiped with rubbing alcohol. 3 cc of 1% lidocaine without epinephrine was injected in the subcutaneous tissue. The area was prepped with betadine. A stabbing incision was made with an 11 blade scalpel. The Nexplanon was identified and grasped with curved hemostat. It was removed intact. A bandage was placed over the incision site. Patient tolerated the procedure well.

## 2019-04-05 NOTE — PROGRESS NOTES
Subjective:       Patient ID: Daphnie Vazquez is a 29 y.o. female.    Chief Complaint:  NEXPLANON INSERTION (BUY AND BILL) and Annual Exam    History of Present Illness:  HPI  SUBJECTIVE:   29 y.o. female  here for annual.     She describes her periods as irregular bleeding with Nexplanon in place. She had abnrmal prolonged bleeding for about a year after Nexplanon insertion 3-4 years ago.  denies break through bleeding.   denies vaginal itching or irritation.  denies vaginal discharge.    She is sexually active.  She uses Nexplanon for contraception.  General Surgery resident (PGY3).  lives in [place].    History of abnormal pap: Yes - ASC-H > YARELI III colpo > LEEP in 2018. Margin clear but <1 mm clear so plan for pap/ECC 4-6 months.  Last Pap: 2018, as above. She has had 2/3 HPV vaccine.  Last MMG: N/A, had right breast U/S for palpable benign mass 2019  Last Colonoscopy: N/A    FH: Denies family history of breast, GYN or colon cancer.    GYN & OB History  Patient's last menstrual period was 2019.   Date of Last Pap: 3/9/2018    OB History    Para Term  AB Living   0 0 0 0 0 0   SAB TAB Ectopic Multiple Live Births   0 0 0 0 0       Review of Systems  Review of Systems   Constitutional: Negative for chills, diaphoresis, fatigue and fever.   Respiratory: Negative for cough and shortness of breath.    Cardiovascular: Negative for chest pain and palpitations.   Gastrointestinal: Negative for abdominal pain, constipation, diarrhea, nausea and vomiting.   Genitourinary: Negative for dysmenorrhea, dyspareunia, dysuria, frequency, menorrhagia, menstrual problem, pelvic pain, vaginal bleeding, vaginal discharge and vaginal pain.   Musculoskeletal: Negative for back pain and myalgias.   Integumentary:  Negative for rash, acne, breast mass, nipple discharge and breast skin changes.   Neurological: Negative for headaches.   Psychiatric/Behavioral: Negative for depression. The patient is  not nervous/anxious.    Breast: Negative for mass, mastodynia, nipple discharge and skin changes          Objective:    Physical Exam:   Constitutional: She is oriented to person, place, and time. She appears well-developed and well-nourished. No distress.    HENT:   Head: Normocephalic and atraumatic.    Eyes: EOM are normal.    Neck: Normal range of motion. No thyromegaly present.     Pulmonary/Chest: Effort normal. She exhibits no mass and no tenderness. Right breast exhibits no inverted nipple, no mass, no nipple discharge, no skin change, no tenderness and no swelling. Left breast exhibits no inverted nipple, no mass, no nipple discharge, no skin change, no tenderness and no swelling. Breasts are symmetrical.        Abdominal: Soft. She exhibits no distension and no mass. There is no tenderness. There is no rebound and no guarding. No hernia.     Genitourinary:   Genitourinary Comments: Normal external female genitalia; vagina rugated, normal; cervix normal, no masses; uterus small mobile nontender; no adnexal masses palpated.           Musculoskeletal: Normal range of motion and moves all extremeties.       Neurological: She is alert and oriented to person, place, and time.    Skin: Skin is warm. No rash noted.    Psychiatric: She has a normal mood and affect. Her behavior is normal. Judgment and thought content normal.          Assessment:        1. Well woman exam with routine gynecological exam    2. Encounter for initial prescription of implantable subdermal contraceptive    3. YARELI III (cervical intraepithelial neoplasia grade III) with severe dysplasia             Plan:      Daphnie was seen today for nexplanon insertion and annual exam.    Diagnoses and all orders for this visit:    Well woman exam with routine gynecological exam  - Pap guidelines discussed. Pap/HPV/ECC collected.  - MMG age 39.   - Contraception - Nexplanon removed and replaced inferiorly to prior device location.  - STD screening -  declined.    -     Liquid-based pap smear, screening  -     HPV High Risk Genotypes, PCR  -     Tissue Specimen To Pathology, Obstetrics/Gynecology    Encounter for initial prescription of implantable subdermal contraceptive  - Counseled on estrace use of AUB occurs on Nexplanon.    -     estradiol (ESTRACE) 2 MG tablet; Take 1 tablet (2 mg total) by mouth once daily.  -     Insertion of Nexplanon  -     etonogestrel Impl 68 mg    YARELI III (cervical intraepithelial neoplasia grade III) with severe dysplasia  -     Liquid-based pap smear, screening  -     HPV High Risk Genotypes, PCR  -     Tissue Specimen To Pathology, Obstetrics/Gynecology    Orders Placed This Encounter   Procedures    Insertion of Nexplanon    HPV High Risk Genotypes, PCR       Follow up in about 1 year (around 4/4/2020) for annual.

## 2019-04-05 NOTE — PROCEDURES
Insertion of Nexplanon  Date/Time: 4/4/2019 9:36 PM  Performed by: Darlene Tang MD  Authorized by: Darlene Tang MD     Consent obtained:  Written  Consent given by:  Patient  Patient questions answered: yes    Patient agrees, verbalizes understanding, and wants to proceed: yes    Educational handouts given: yes    Instructions and paperwork completed: yes    Pre-procedure timeout performed: yes    Local anesthetic:  Lidocaine 1%  The site was cleaned and prepped in a sterile fashion: yes    Small stab incision was made in arm: yes    Left/right:  Left   68 mg etonogestrel 68 mg  Preloaded Implanon trocar was placed subdermally: yes    Visualization of implant was obtained: yes    Nexplanon was inserted and trocar removed: yes    Visualization of notch in stilette and palpitation of device: yes    Palpitation confirms placement by provider and patient: yes    Site was closed with steri-strips and pressure bandage applied: yes

## 2019-04-10 LAB
HPV HR 12 DNA CVX QL NAA+PROBE: NEGATIVE
HPV16 AG SPEC QL: NEGATIVE
HPV18 DNA SPEC QL NAA+PROBE: NEGATIVE

## 2019-04-12 ENCOUNTER — PATIENT MESSAGE (OUTPATIENT)
Dept: OBSTETRICS AND GYNECOLOGY | Facility: CLINIC | Age: 29
End: 2019-04-12

## 2019-08-07 ENCOUNTER — OFFICE VISIT (OUTPATIENT)
Dept: ELECTROPHYSIOLOGY | Facility: CLINIC | Age: 29
End: 2019-08-07
Payer: COMMERCIAL

## 2019-08-07 VITALS
WEIGHT: 123.44 LBS | SYSTOLIC BLOOD PRESSURE: 104 MMHG | BODY MASS INDEX: 18.28 KG/M2 | HEIGHT: 69 IN | HEART RATE: 72 BPM | DIASTOLIC BLOOD PRESSURE: 60 MMHG

## 2019-08-07 DIAGNOSIS — R00.2 PALPITATIONS: ICD-10-CM

## 2019-08-07 DIAGNOSIS — R00.0 TACHYCARDIA: Primary | ICD-10-CM

## 2019-08-07 PROCEDURE — 99204 PR OFFICE/OUTPT VISIT, NEW, LEVL IV, 45-59 MIN: ICD-10-PCS | Mod: S$GLB,,, | Performed by: INTERNAL MEDICINE

## 2019-08-07 PROCEDURE — 93010 RHYTHM STRIP: ICD-10-PCS | Mod: S$GLB,,, | Performed by: INTERNAL MEDICINE

## 2019-08-07 PROCEDURE — 93005 ELECTROCARDIOGRAM TRACING: CPT | Mod: S$GLB,,, | Performed by: INTERNAL MEDICINE

## 2019-08-07 PROCEDURE — 99999 PR PBB SHADOW E&M-EST. PATIENT-LVL III: CPT | Mod: PBBFAC,,, | Performed by: INTERNAL MEDICINE

## 2019-08-07 PROCEDURE — 99204 OFFICE O/P NEW MOD 45 MIN: CPT | Mod: S$GLB,,, | Performed by: INTERNAL MEDICINE

## 2019-08-07 PROCEDURE — 3008F PR BODY MASS INDEX (BMI) DOCUMENTED: ICD-10-PCS | Mod: CPTII,S$GLB,, | Performed by: INTERNAL MEDICINE

## 2019-08-07 PROCEDURE — 3008F BODY MASS INDEX DOCD: CPT | Mod: CPTII,S$GLB,, | Performed by: INTERNAL MEDICINE

## 2019-08-07 PROCEDURE — 93010 ELECTROCARDIOGRAM REPORT: CPT | Mod: S$GLB,,, | Performed by: INTERNAL MEDICINE

## 2019-08-07 PROCEDURE — 93005 RHYTHM STRIP: ICD-10-PCS | Mod: S$GLB,,, | Performed by: INTERNAL MEDICINE

## 2019-08-07 PROCEDURE — 99999 PR PBB SHADOW E&M-EST. PATIENT-LVL III: ICD-10-PCS | Mod: PBBFAC,,, | Performed by: INTERNAL MEDICINE

## 2019-08-07 NOTE — PROGRESS NOTES
Subjective:    Patient ID:  Daphnie Vazquez is a 29 y.o. 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 She noted since childhood having infrequent episodes of a sustained tachyarrhythmia that lasts for minutes at a time. She notes during these periods her heart rate will sustain at 150-180 bpm. She can normally terminate it with putting her head between her legs and performing a valsalva. Recently however she has had several more severe episodes. One episode lasted an hour and did not respond to a valsalva. She felt tingling in her hands, dizzy and chest discomfort. It eventually stopped on her own. She then had another episode a few hours later and was going to come to the ER however it terminated again on its own. Since then she has been taking 12.5mg of metoprolol as needed, mostly for what she feels is ectopic beats. She notes she separately has periods of a forceful heart beat that is also uncomfortable. She checks her pulse and measures it around 30 beats per minute but can tell she is likely in bigeminy, lasts ~15 minutes when it happens.     My interpretation of today's in clinic ECG and rhythm strip is sinus rhythm without pre-excitation and normal intervals. Extended rhythm strip captured a brief period of PVCs in a trigeminal pattern. LBBB, V3 transition (V2 transition ratio essentially 0, inferiorly directed, rS in limb lead I.    Review of Systems   Constitution: Negative for fever and malaise/fatigue.   HENT: Negative for congestion and sore throat.    Eyes: Negative for blurred vision and visual disturbance.   Cardiovascular: Positive for palpitations. Negative for chest pain, dyspnea on exertion, irregular heartbeat, leg swelling, near-syncope, orthopnea, paroxysmal nocturnal dyspnea and syncope.   Respiratory: Negative  for cough and shortness of breath.    Hematologic/Lymphatic: Negative for bleeding problem. Does not bruise/bleed easily.   Skin: Negative.    Musculoskeletal: Negative.    Gastrointestinal: Negative for bloating and abdominal pain.   Neurological: Negative for focal weakness and weakness.        Objective:    Physical Exam   Constitutional: She is oriented to person, place, and time. She appears well-developed and well-nourished. No distress.   HENT:   Head: Normocephalic and atraumatic.   Eyes: Conjunctivae are normal. Right eye exhibits no discharge. Left eye exhibits no discharge.   Neck: Neck supple. No JVD present.   Cardiovascular: Normal rate and regular rhythm. Exam reveals no gallop and no friction rub.   No murmur heard.  Pulmonary/Chest: Effort normal and breath sounds normal. No respiratory distress. She has no wheezes. She has no rales.   Abdominal: Soft. Bowel sounds are normal. She exhibits no distension. There is no tenderness. There is no rebound.   Musculoskeletal: She exhibits no edema.   Neurological: She is alert and oriented to person, place, and time.   Skin: Skin is warm and dry. She is not diaphoretic.   Psychiatric: She has a normal mood and affect. Her behavior is normal. Judgment and thought content normal.   Vitals reviewed.        Assessment:       1. Tachycardia    2. 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 She is likely having a reentrant SVT, either AVNRT or AVRT.. Discussed need for a symptom-rhythm correlate. Also discussed etiology of PVCs and that they are coming from a typical location. Reassurance provided and treatment in patients with structurally normal hearts is symptom based. Frequency of sustained arrhythmia is once a month.  Discussed 30 day event monitor versus ZIO patch (ZIO patch will also give ectopy burden count). She would like ZIO patch, if affordable. If not will use 30 day monitor and she can place it prn. Would like for her to hold metoprolol while wearing the monitor. Will also get a baseline echocardiogram. Will discuss results on phone and see back in 6 weeks.    Thank you for allowing me to participate in the care of this patient. Please do not hesitate to call me with any questions or concerns.    Neeraj Jain MD, PhD  Cardiac Electrophysiology

## 2019-08-08 ENCOUNTER — TELEPHONE (OUTPATIENT)
Dept: ELECTROPHYSIOLOGY | Facility: CLINIC | Age: 29
End: 2019-08-08

## 2019-08-08 ENCOUNTER — HOSPITAL ENCOUNTER (OUTPATIENT)
Dept: CARDIOLOGY | Facility: CLINIC | Age: 29
Discharge: HOME | End: 2019-08-08
Attending: INTERNAL MEDICINE
Payer: COMMERCIAL

## 2019-08-08 VITALS
HEIGHT: 68 IN | DIASTOLIC BLOOD PRESSURE: 50 MMHG | WEIGHT: 123 LBS | BODY MASS INDEX: 18.64 KG/M2 | SYSTOLIC BLOOD PRESSURE: 104 MMHG | HEART RATE: 63 BPM

## 2019-08-08 DIAGNOSIS — R00.0 TACHYCARDIA: ICD-10-CM

## 2019-08-08 LAB
ASCENDING AORTA: 2.68 CM
AV INDEX (PROSTH): 0.75
AV MEAN GRADIENT: 6 MMHG
AV PEAK GRADIENT: 9 MMHG
AV VALVE AREA: 1.87 CM2
AV VELOCITY RATIO: 0.67
BSA FOR ECHO PROCEDURE: 1.64 M2
CV ECHO LV RWT: 0.3 CM
DOP CALC AO PEAK VEL: 1.53 M/S
DOP CALC AO VTI: 27.45 CM
DOP CALC LVOT AREA: 2.5 CM2
DOP CALC LVOT DIAMETER: 1.78 CM
DOP CALC LVOT PEAK VEL: 1.03 M/S
DOP CALC LVOT STROKE VOLUME: 51.24 CM3
DOP CALCLVOT PEAK VEL VTI: 20.6 CM
E WAVE DECELERATION TIME: 166.65 MSEC
E/A RATIO: 2.38
E/E' RATIO: 6.06 M/S
ECHO LV POSTERIOR WALL: 0.63 CM (ref 0.6–1.1)
FRACTIONAL SHORTENING: 35 % (ref 28–44)
INTERVENTRICULAR SEPTUM: 0.63 CM (ref 0.6–1.1)
IVRT: 0.06 MSEC
LA MAJOR: 4.65 CM
LA MINOR: 4.69 CM
LA WIDTH: 3.77 CM
LEFT ATRIUM SIZE: 2.99 CM
LEFT ATRIUM VOLUME INDEX: 26.9 ML/M2
LEFT ATRIUM VOLUME: 44.74 CM3
LEFT INTERNAL DIMENSION IN SYSTOLE: 2.73 CM (ref 2.1–4)
LEFT VENTRICLE DIASTOLIC VOLUME INDEX: 47.17 ML/M2
LEFT VENTRICLE DIASTOLIC VOLUME: 78.41 ML
LEFT VENTRICLE MASS INDEX: 45 G/M2
LEFT VENTRICLE SYSTOLIC VOLUME INDEX: 16.7 ML/M2
LEFT VENTRICLE SYSTOLIC VOLUME: 27.73 ML
LEFT VENTRICULAR INTERNAL DIMENSION IN DIASTOLE: 4.2 CM (ref 3.5–6)
LEFT VENTRICULAR MASS: 74.38 G
LV LATERAL E/E' RATIO: 5.56 M/S
LV SEPTAL E/E' RATIO: 6.67 M/S
MV PEAK A VEL: 0.42 M/S
MV PEAK E VEL: 1 M/S
PISA TR MAX VEL: 2.45 M/S
PULM VEIN S/D RATIO: 1.09
PV PEAK D VEL: 0.55 M/S
PV PEAK S VEL: 0.6 M/S
RA MAJOR: 3.65 CM
RA PRESSURE: 3 MMHG
RA WIDTH: 3.4 CM
RIGHT VENTRICULAR END-DIASTOLIC DIMENSION: 3.51 CM
RV TISSUE DOPPLER FREE WALL SYSTOLIC VELOCITY 1 (APICAL 4 CHAMBER VIEW): 12.74 CM/S
SINUS: 2.62 CM
STJ: 2.51 CM
TDI LATERAL: 0.18 M/S
TDI SEPTAL: 0.15 M/S
TDI: 0.17 M/S
TR MAX PG: 24 MMHG
TRICUSPID ANNULAR PLANE SYSTOLIC EXCURSION: 3.11 CM
TV REST PULMONARY ARTERY PRESSURE: 27 MMHG

## 2019-08-08 PROCEDURE — 93306 TTE W/DOPPLER COMPLETE: CPT | Mod: S$GLB,,, | Performed by: INTERNAL MEDICINE

## 2019-08-08 PROCEDURE — 93306 TRANSTHORACIC ECHO (TTE) COMPLETE (CUPID ONLY): ICD-10-PCS | Mod: S$GLB,,, | Performed by: INTERNAL MEDICINE

## 2019-08-08 NOTE — TELEPHONE ENCOUNTER
----- Message from Neeraj Jain MD sent at 8/8/2019  5:02 PM CDT -----  Please notify Dr. Vazquez her echo shows a structurally normal heart.

## 2019-09-12 ENCOUNTER — TELEPHONE (OUTPATIENT)
Dept: ELECTROPHYSIOLOGY | Facility: CLINIC | Age: 29
End: 2019-09-12

## 2019-11-14 ENCOUNTER — HOSPITAL ENCOUNTER (EMERGENCY)
Facility: OTHER | Age: 29
Discharge: HOME | End: 2019-11-14
Attending: EMERGENCY MEDICINE
Payer: COMMERCIAL

## 2019-11-14 VITALS
RESPIRATION RATE: 17 BRPM | SYSTOLIC BLOOD PRESSURE: 110 MMHG | BODY MASS INDEX: 19.01 KG/M2 | TEMPERATURE: 98 F | OXYGEN SATURATION: 100 % | WEIGHT: 125 LBS | DIASTOLIC BLOOD PRESSURE: 69 MMHG | HEART RATE: 76 BPM

## 2019-11-14 DIAGNOSIS — I47.10 SVT (SUPRAVENTRICULAR TACHYCARDIA): Primary | ICD-10-CM

## 2019-11-14 LAB
ALBUMIN SERPL BCP-MCNC: 4.6 G/DL (ref 3.5–5.2)
ALP SERPL-CCNC: 67 U/L (ref 55–135)
ALT SERPL W/O P-5'-P-CCNC: 16 U/L (ref 10–44)
ANION GAP SERPL CALC-SCNC: 12 MMOL/L (ref 8–16)
AST SERPL-CCNC: 24 U/L (ref 10–40)
BASOPHILS # BLD AUTO: 0.04 K/UL (ref 0–0.2)
BASOPHILS NFR BLD: 0.5 % (ref 0–1.9)
BILIRUB SERPL-MCNC: 0.4 MG/DL (ref 0.1–1)
BUN SERPL-MCNC: 10 MG/DL (ref 6–20)
CALCIUM SERPL-MCNC: 10 MG/DL (ref 8.7–10.5)
CHLORIDE SERPL-SCNC: 106 MMOL/L (ref 95–110)
CO2 SERPL-SCNC: 22 MMOL/L (ref 23–29)
CREAT SERPL-MCNC: 0.8 MG/DL (ref 0.5–1.4)
DIFFERENTIAL METHOD: NORMAL
EOSINOPHIL # BLD AUTO: 0.1 K/UL (ref 0–0.5)
EOSINOPHIL NFR BLD: 0.8 % (ref 0–8)
ERYTHROCYTE [DISTWIDTH] IN BLOOD BY AUTOMATED COUNT: 11.6 % (ref 11.5–14.5)
EST. GFR  (AFRICAN AMERICAN): >60 ML/MIN/1.73 M^2
EST. GFR  (NON AFRICAN AMERICAN): >60 ML/MIN/1.73 M^2
GLUCOSE SERPL-MCNC: 86 MG/DL (ref 70–110)
HCT VFR BLD AUTO: 42.8 % (ref 37–48.5)
HGB BLD-MCNC: 14.4 G/DL (ref 12–16)
IMM GRANULOCYTES # BLD AUTO: 0.03 K/UL (ref 0–0.04)
IMM GRANULOCYTES NFR BLD AUTO: 0.4 % (ref 0–0.5)
LYMPHOCYTES # BLD AUTO: 2.5 K/UL (ref 1–4.8)
LYMPHOCYTES NFR BLD: 33 % (ref 18–48)
MCH RBC QN AUTO: 29.8 PG (ref 27–31)
MCHC RBC AUTO-ENTMCNC: 33.6 G/DL (ref 32–36)
MCV RBC AUTO: 89 FL (ref 82–98)
MONOCYTES # BLD AUTO: 0.7 K/UL (ref 0.3–1)
MONOCYTES NFR BLD: 9.1 % (ref 4–15)
NEUTROPHILS # BLD AUTO: 4.3 K/UL (ref 1.8–7.7)
NEUTROPHILS NFR BLD: 56.2 % (ref 38–73)
NRBC BLD-RTO: 0 /100 WBC
PLATELET # BLD AUTO: 338 K/UL (ref 150–350)
PMV BLD AUTO: 9.8 FL (ref 9.2–12.9)
POTASSIUM SERPL-SCNC: 3.8 MMOL/L (ref 3.5–5.1)
PROT SERPL-MCNC: 8.2 G/DL (ref 6–8.4)
RBC # BLD AUTO: 4.83 M/UL (ref 4–5.4)
SODIUM SERPL-SCNC: 140 MMOL/L (ref 136–145)
TROPONIN I SERPL DL<=0.01 NG/ML-MCNC: <0.006 NG/ML (ref 0–0.03)
TSH SERPL DL<=0.005 MIU/L-ACNC: 1.51 UIU/ML (ref 0.4–4)
WBC # BLD AUTO: 7.66 K/UL (ref 3.9–12.7)

## 2019-11-14 PROCEDURE — 84484 ASSAY OF TROPONIN QUANT: CPT

## 2019-11-14 PROCEDURE — 80053 COMPREHEN METABOLIC PANEL: CPT

## 2019-11-14 PROCEDURE — 85025 COMPLETE CBC W/AUTO DIFF WBC: CPT

## 2019-11-14 PROCEDURE — 96374 THER/PROPH/DIAG INJ IV PUSH: CPT

## 2019-11-14 PROCEDURE — 93010 EKG 12-LEAD: ICD-10-PCS | Mod: 76,,, | Performed by: INTERNAL MEDICINE

## 2019-11-14 PROCEDURE — 93010 ELECTROCARDIOGRAM REPORT: CPT | Mod: 76,,, | Performed by: INTERNAL MEDICINE

## 2019-11-14 PROCEDURE — 84443 ASSAY THYROID STIM HORMONE: CPT

## 2019-11-14 PROCEDURE — 25000003 PHARM REV CODE 250: Performed by: PHYSICIAN ASSISTANT

## 2019-11-14 PROCEDURE — 93005 ELECTROCARDIOGRAM TRACING: CPT

## 2019-11-14 PROCEDURE — 99291 CRITICAL CARE FIRST HOUR: CPT | Mod: 25

## 2019-11-14 RX ORDER — METOPROLOL TARTRATE 1 MG/ML
5 INJECTION, SOLUTION INTRAVENOUS
Status: COMPLETED | OUTPATIENT
Start: 2019-11-14 | End: 2019-11-14

## 2019-11-14 RX ORDER — ADENOSINE 3 MG/ML
INJECTION, SOLUTION INTRAVENOUS
Status: DISCONTINUED
Start: 2019-11-14 | End: 2019-11-14 | Stop reason: WASHOUT

## 2019-11-14 RX ORDER — ADENOSINE 3 MG/ML
6 INJECTION, SOLUTION INTRAVENOUS
Status: DISCONTINUED | OUTPATIENT
Start: 2019-11-14 | End: 2019-11-14

## 2019-11-14 RX ADMIN — METOPROLOL TARTRATE 5 MG: 1 INJECTION, SOLUTION INTRAVENOUS at 01:11

## 2019-11-14 NOTE — ED PROVIDER NOTES
"Encounter Date: 11/14/2019       History     Chief Complaint   Patient presents with    Tachycardia     pt was eating luncyh and reports tachycardia. PMH SVT. pt reports she hasn't taken metoprolol > several weeks. Pt reports" I feel like i am going to pass out"      Patient is a 29-year-old female with thyroid disease and history of tachyarrhythmia who presents to the emergency department with rapid heartbeat, lightheadedness and mild shortness of breath onset approximately 15 min prior to arrival.  Patient has longstanding history of tachyarrhythmia which usually last for minutes at a time that she can self resolved with Valsalva maneuvers.  Patient states she attempted to put her head between her legs and a carotid massage without relief.  She states she has not needed emergent evaluation before.  She has seen electrophysiology and had a normal echo this year.  Patient states she does have an order for Holter monitor but has not worn this yet.  Patient also has history of PVCs, she takes metoprolol 12.5 mg as needed for palpitations.  Patient states she has not taken metoprolol and greater than 1 month.  She denies nausea, emesis, or diaphoresis.    The history is provided by the patient.     Review of patient's allergies indicates:  No Known Allergies  Past Medical History:   Diagnosis Date    Abnormal Pap smear of cervix     LEEP    ADHD (attention deficit hyperactivity disorder)     Raynaud phenomenon     Thyroid disease     Vaccine for human papilloma virus (HPV) types 6, 11, 16, and 18 administered 2009     Past Surgical History:   Procedure Laterality Date    AUGMENTATION OF BREAST      BREAST SURGERY      augmentation    CERVICAL BIOPSY  W/ LOOP ELECTRODE EXCISION  age 17    Right hemithyroidectomy       Family History   Problem Relation Age of Onset    Hypertension Father     Macular degeneration Maternal Grandfather     Breast cancer Neg Hx     Colon cancer Neg Hx     Ovarian cancer Neg Hx "     Amblyopia Neg Hx     Blindness Neg Hx     Cataracts Neg Hx     Glaucoma Neg Hx     Retinal detachment Neg Hx     Strabismus Neg Hx      Social History     Tobacco Use    Smoking status: Never Smoker    Smokeless tobacco: Never Used   Substance Use Topics    Alcohol use: Yes     Comment: occasionally     Drug use: No     Review of Systems   Constitutional: Negative for chills and fever.   HENT: Negative for congestion and sore throat.    Eyes: Negative for pain.   Respiratory: Positive for chest tightness and shortness of breath.    Cardiovascular: Positive for palpitations.   Gastrointestinal: Negative for abdominal pain, diarrhea, nausea and vomiting.   Musculoskeletal: Negative for arthralgias.   Skin: Negative for rash.   Allergic/Immunologic: Negative for immunocompromised state.   Neurological: Positive for dizziness and light-headedness. Negative for headaches.   Psychiatric/Behavioral: The patient is nervous/anxious.        Physical Exam     Initial Vitals [11/14/19 1259]   BP Pulse Resp Temp SpO2   (!) 139/92 (!) 183 (!) 21 -- 100 %      MAP       --         Physical Exam    Vitals reviewed.  Constitutional: She is not diaphoretic. She is cooperative. She appears distressed (Appears slightly anxious).   HENT:   Head: Normocephalic and atraumatic.   Eyes: Conjunctivae and EOM are normal.   Neck: Normal range of motion. Neck supple.   Cardiovascular: Regular rhythm and intact distal pulses. Tachycardia present.    No murmur heard.  Pulmonary/Chest: Breath sounds normal. She has no wheezes. She has no rhonchi. She has no rales.   Abdominal: Soft. Bowel sounds are normal. There is no tenderness.   Musculoskeletal: Normal range of motion. She exhibits no edema.   Neurological: She is alert and oriented to person, place, and time. GCS eye subscore is 4. GCS verbal subscore is 5. GCS motor subscore is 6.   Skin: Skin is warm and dry. No rash noted.         ED Course   Critical Care  Date/Time:  11/14/2019 2:48 PM  Performed by: Don Bailey PA-C  Authorized by: Brooks Dorado MD   Direct patient critical care time: 5 minutes  Additional history critical care time: 5 minutes  Ordering / reviewing critical care time: 5 minutes  Documentation critical care time: 5 minutes  Consulting other physicians critical care time: 5 minutes  Consult with family critical care time: 5 minutes  Other critical care time: 5 minutes  Total critical care time (exclusive of procedural time) : 35 minutes  Critical care was necessary to treat or prevent imminent or life-threatening deterioration of the following conditions: cardiac failure.  Critical care was time spent personally by me on the following activities: evaluation of patient's response to treatment, examination of patient, re-evaluation of patient's condition, review of old charts and ordering and performing treatments and interventions.        Labs Reviewed   COMPREHENSIVE METABOLIC PANEL - Abnormal; Notable for the following components:       Result Value    CO2 22 (*)     All other components within normal limits   CBC W/ AUTO DIFFERENTIAL   TSH   TROPONIN I     EKG Readings: (Independently Interpreted)   1300: Supraventricular tachycardia at a rate of 190 beats per minute. No STEMI.  1338:  Normal sinus rhythm at a rate of 65 beats per minute.  No STEMI.  No ischemic changes.       Imaging Results    None          Medical Decision Making:   History:   Old Records Summarized: records from clinic visits.       <> Summary of Records: Echo from 8/8/2019: · Normal left ventricular systolic function. The estimated ejection fraction is 60-65%  · No wall motion abnormalities.  · Normal LV diastolic function.  · Normal right ventricular systolic function.  · The estimated PA systolic pressure is 27 mm Hg  · Normal central venous pressure (3 mm Hg).  Initial Assessment:   Emergent evaluation of a 29 y.o. female with a medical history of thyroid disorder and  tachyarrhythmia presenting to the emergency department complaining of rapid heart rate, shortness of breath and lightheadedness. Patient is afebrile, nontoxic appearing.  Significant tachycardia noted on exam with other vital signs stable.   EKG consistent with SVT.  Patient attempted 2 vagal maneuvers prior to arrival.  We did attempt syringe vagal maneuver but patient refused this any further as she became very lightheaded.  Clinical Tests:   Medical Tests: Ordered and Reviewed  ED Management:  -metoprolol 5 mg IV push initially, heart rate reduced to 155, not sinus rhythm.  -additional 5 mg metoprolol was given, patient converted to normal sinus rhythm.  - patient returned to baseline, was monitored for approximately 45 min after converting to normal sinus rhythm.  -lab work revealed normal CBC, CMP, TSH and troponin.  - patient was comfortable being discharged home.  She will follow up with her electrophysiologist.  She is given strict return precautions.  She is advised to rest today and avoid stimulating factors.   Patient had no other complaints today and was stable at discharge.  Other:   I have discussed this case with another health care provider.                                 Clinical Impression:     1. SVT (supraventricular tachycardia)                            Don Bailey PA-C  11/14/19 [id number]

## 2019-11-14 NOTE — ED NOTES
Dr morrissey at bedside for evaluation. Pt placed on defib pads, continuous cardiac monitoring, bp and pulse ox. 2 18g IV established.

## 2019-11-18 ENCOUNTER — PATIENT MESSAGE (OUTPATIENT)
Dept: ELECTROPHYSIOLOGY | Facility: CLINIC | Age: 29
End: 2019-11-18

## 2019-11-21 ENCOUNTER — TELEPHONE (OUTPATIENT)
Dept: CARDIOLOGY | Facility: HOSPITAL | Age: 29
End: 2019-11-21

## 2019-11-22 ENCOUNTER — CLINICAL SUPPORT (OUTPATIENT)
Dept: CARDIOLOGY | Facility: HOSPITAL | Age: 29
End: 2019-11-22
Attending: INTERNAL MEDICINE
Payer: COMMERCIAL

## 2019-11-22 DIAGNOSIS — R00.0 TACHYCARDIA: ICD-10-CM

## 2019-11-22 PROCEDURE — 0298T HOLTER MONITOR - 3-14 DAY ADULT (CUPID ONLY): ICD-10-PCS | Mod: ,,, | Performed by: INTERNAL MEDICINE

## 2019-11-22 PROCEDURE — 0298T HOLTER MONITOR - 3-14 DAY ADULT (CUPID ONLY): CPT | Mod: ,,, | Performed by: INTERNAL MEDICINE

## 2019-12-15 ENCOUNTER — TELEPHONE (OUTPATIENT)
Dept: ELECTROPHYSIOLOGY | Facility: CLINIC | Age: 29
End: 2019-12-15

## 2019-12-15 NOTE — TELEPHONE ENCOUNTER
Discussed ZIO results with patient (Regular narrow complex SVT, 1 12-beat episode of nonsustained VT, and other symptom-rhythm correlates of PVCs but with low overall PVC burden). Discussed medical therapy versus EPS/ablation of SVT. She reports having 2-3 episodes of SVT a week now that typically convert with vagal maneuvers however sometimes she needs to take prn 12.5mg metoprolol. Recommend trying 12.5mg bid metoprolol for now with additional PRN if needed. She will think about ablation and will call back after an upcoming vacation.

## 2020-01-06 ENCOUNTER — OFFICE VISIT (OUTPATIENT)
Dept: INTERNAL MEDICINE | Facility: CLINIC | Age: 30
End: 2020-01-06
Payer: COMMERCIAL

## 2020-01-06 VITALS
SYSTOLIC BLOOD PRESSURE: 114 MMHG | HEART RATE: 56 BPM | BODY MASS INDEX: 19.52 KG/M2 | DIASTOLIC BLOOD PRESSURE: 62 MMHG | WEIGHT: 131.81 LBS | OXYGEN SATURATION: 99 % | HEIGHT: 69 IN

## 2020-01-06 DIAGNOSIS — Z29.89 NEED FOR MALARIA PROPHYLAXIS: ICD-10-CM

## 2020-01-06 DIAGNOSIS — A09 TRAVELER'S DIARRHEA: ICD-10-CM

## 2020-01-06 DIAGNOSIS — R00.2 PALPITATIONS: ICD-10-CM

## 2020-01-06 DIAGNOSIS — Z23 ENCOUNTER FOR VACCINATION: Primary | ICD-10-CM

## 2020-01-06 PROCEDURE — 99999 PR PBB SHADOW E&M-EST. PATIENT-LVL IV: CPT | Mod: PBBFAC,,, | Performed by: FAMILY MEDICINE

## 2020-01-06 PROCEDURE — 3008F PR BODY MASS INDEX (BMI) DOCUMENTED: ICD-10-PCS | Mod: CPTII,S$GLB,, | Performed by: FAMILY MEDICINE

## 2020-01-06 PROCEDURE — 99999 PR PBB SHADOW E&M-EST. PATIENT-LVL IV: ICD-10-PCS | Mod: PBBFAC,,, | Performed by: FAMILY MEDICINE

## 2020-01-06 PROCEDURE — 3008F BODY MASS INDEX DOCD: CPT | Mod: CPTII,S$GLB,, | Performed by: FAMILY MEDICINE

## 2020-01-06 PROCEDURE — 99214 OFFICE O/P EST MOD 30 MIN: CPT | Mod: S$GLB,,, | Performed by: FAMILY MEDICINE

## 2020-01-06 PROCEDURE — 99214 PR OFFICE/OUTPT VISIT, EST, LEVL IV, 30-39 MIN: ICD-10-PCS | Mod: S$GLB,,, | Performed by: FAMILY MEDICINE

## 2020-01-06 RX ORDER — METOPROLOL TARTRATE 25 MG/1
25 TABLET, FILM COATED ORAL DAILY PRN
Qty: 30 TABLET | Refills: 3 | Status: CANCELLED | OUTPATIENT
Start: 2020-01-06

## 2020-01-06 RX ORDER — METOPROLOL TARTRATE 25 MG/1
25 TABLET, FILM COATED ORAL DAILY PRN
Qty: 30 TABLET | Refills: 3 | Status: SHIPPED | OUTPATIENT
Start: 2020-01-06 | End: 2020-03-24 | Stop reason: SDUPTHER

## 2020-01-06 RX ORDER — ATOVAQUONE AND PROGUANIL HYDROCHLORIDE 250; 100 MG/1; MG/1
1 TABLET, FILM COATED ORAL DAILY
Qty: 25 TABLET | Refills: 0 | Status: SHIPPED | OUTPATIENT
Start: 2020-01-06 | End: 2020-06-23

## 2020-01-06 RX ORDER — AZITHROMYCIN 500 MG/1
500 TABLET, FILM COATED ORAL DAILY
Qty: 3 TABLET | Refills: 0 | Status: SHIPPED | OUTPATIENT
Start: 2020-01-06 | End: 2020-06-23

## 2020-01-06 NOTE — PROGRESS NOTES
Subjective:       Patient ID: Daphnie Vazquez is a 29 y.o. female.    Chief Complaint: Establish Care    HPI    29yoF here to discuss travel vaccinations. Will be travelling to [place] in 2wks for 2wks. 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     History of SVT, treats with beta blocker daily.    Review of Systems   Constitutional: Negative for appetite change, fatigue and fever.   HENT: Negative for ear pain, sinus pain and sore throat.    Respiratory: Negative for cough and shortness of breath.    Cardiovascular: Negative for chest pain and palpitations.   Gastrointestinal: Negative for abdominal pain, constipation, diarrhea, nausea and vomiting.   Genitourinary: Negative for dysuria.   Musculoskeletal: Negative for back pain.   Skin: Negative for rash.   Neurological: Negative for weakness, numbness and headaches.         Past Medical History:   Diagnosis Date    Abnormal Pap smear of cervix     LEEP    ADHD (attention deficit hyperactivity disorder)     Raynaud phenomenon     Thyroid disease     Vaccine for human papilloma virus (HPV) types 6, 11, 16, and 18 administered 2009        Prior to Admission medications    Medication Sig Start Date End Date Taking? Authorizing Provider   amoxicillin-clavulanate 875-125mg (AUGMENTIN) 875-125 mg per tablet Take 1 tablet by mouth twice daily 11/8/19   Daphnie Vazquez MD   etonogestrel (NEXPLANON) 68 mg Impl by Subdermal route.    Historical Provider, MD   metoprolol tartrate (LOPRESSOR) 25 MG tablet Take 1 tablet (25 mg total) by mouth daily as needed for tachycardia 11/29/19   Daphnie Vazquez MD   ondansetron (ZOFRAN-ODT) 8 MG TbDL TAKE ONE TABLET BY MOUTH EVERY 8 HOURS AS NEEDED FOR NAUSEA. 11/1/19   Daphnie Vazquez MD        Past medical history, surgical history, and family medical history reviewed and updated as appropriate.    Medications and allergies reviewed.     Objective:          Vitals:    01/06/20 1629   BP: 114/62   BP  "Location: Right arm   Patient Position: Sitting   BP Method: Medium (Manual)   Pulse: (!) 56   SpO2: 99%   Weight: 59.8 kg (131 lb 13.4 oz)   Height: 5' 9" (1.753 m)     Body mass index is 19.47 kg/m².  Physical Exam   Constitutional: She appears well-developed and well-nourished.   Eyes: Pupils are equal, round, and reactive to light. EOM are normal.   Cardiovascular: Normal rate, regular rhythm, normal heart sounds and intact distal pulses.   No murmur heard.  Pulmonary/Chest: Effort normal and breath sounds normal. No respiratory distress. She has no wheezes.   Vitals reviewed.      Lab Results   Component Value Date    WBC 7.66 11/14/2019    HGB 14.4 11/14/2019    HCT 42.8 11/14/2019     11/14/2019    ALT 16 11/14/2019    AST 24 11/14/2019     11/14/2019    K 3.8 11/14/2019     11/14/2019    CREATININE 0.8 11/14/2019    BUN 10 11/14/2019    CO2 22 (L) 11/14/2019    TSH 1.508 11/14/2019       Assessment:       1. Encounter for vaccination    2. Palpitations    3. Need for malaria prophylaxis    4. Traveler's diarrhea        Plan:   Daphnie was seen today for establish care.    Diagnoses and all orders for this visit:    [facility] travel clinic appt next Monday 1/13 for vaccinations. Prophylaxis prescribed today. patietn to call phone number per summary to speak to staff with travel clinic to figure out best course of action for obtaining travel vaccines.    Encounter for vaccination  -     Ambulatory consult to Infectious Disease    Palpitations  -     metoprolol tartrate (LOPRESSOR) 25 MG tablet; Take 1 tablet (25 mg total) by mouth daily as needed for tachycardia    Need for malaria prophylaxis  -     atovaquone-proguanil (MALARONE) 250-100 mg Tab; Take 1 tablet by mouth once daily.    Traveler's diarrhea  -     azithromycin (ZITHROMAX) 500 MG tablet; Take 1 tablet (500 mg total) by mouth once daily. Take once daily for 3 days if develop symptoms.    Other orders  -     Cancel: metoprolol tartrate " (LOPRESSOR) 25 MG tablet; Take 1 tablet (25 mg total) by mouth daily as needed for tachycardia    No follow-ups on file.    Brooks Keller MD  Family Medicine  [facility]  1/6/2020

## 2020-01-06 NOTE — PATIENT INSTRUCTIONS
Call [phone] to speak with [facility] (select option 2) to discuss vaccines needed and best options as far as obtaining your vaccines.

## 2020-01-07 ENCOUNTER — PATIENT MESSAGE (OUTPATIENT)
Dept: INFECTIOUS DISEASES | Facility: CLINIC | Age: 30
End: 2020-01-07

## 2020-01-07 ENCOUNTER — TELEPHONE (OUTPATIENT)
Dept: INFECTIOUS DISEASES | Facility: CLINIC | Age: 30
End: 2020-01-07

## 2020-01-07 DIAGNOSIS — Z23 IMMUNIZATION DUE: Primary | ICD-10-CM

## 2020-01-08 ENCOUNTER — CLINICAL SUPPORT (OUTPATIENT)
Dept: INFECTIOUS DISEASES | Facility: CLINIC | Age: 30
End: 2020-01-08
Payer: COMMERCIAL

## 2020-01-08 DIAGNOSIS — Z23 IMMUNIZATION DUE: ICD-10-CM

## 2020-01-08 PROCEDURE — 90472 TYPHOID VICPS VACCINE IM: ICD-10-PCS | Mod: S$GLB,,, | Performed by: INTERNAL MEDICINE

## 2020-01-08 PROCEDURE — 90472 IMMUNIZATION ADMIN EACH ADD: CPT | Mod: S$GLB,,, | Performed by: INTERNAL MEDICINE

## 2020-01-08 PROCEDURE — 90715 TDAP VACCINE 7 YRS/> IM: CPT | Mod: S$GLB,,, | Performed by: INTERNAL MEDICINE

## 2020-01-08 PROCEDURE — 90715 TDAP VACCINE GREATER THAN OR EQUAL TO 7YO IM: ICD-10-PCS | Mod: S$GLB,,, | Performed by: INTERNAL MEDICINE

## 2020-01-08 PROCEDURE — 90632 HEPA VACCINE ADULT IM: CPT | Mod: S$GLB,,, | Performed by: INTERNAL MEDICINE

## 2020-01-08 PROCEDURE — 90691 TYPHOID VACCINE IM: CPT | Mod: S$GLB,,, | Performed by: INTERNAL MEDICINE

## 2020-01-08 PROCEDURE — 90471 HEPATITIS A VACCINE ADULT IM: ICD-10-PCS | Mod: S$GLB,,, | Performed by: INTERNAL MEDICINE

## 2020-01-08 PROCEDURE — 90691 TYPHOID VICPS VACCINE IM: ICD-10-PCS | Mod: S$GLB,,, | Performed by: INTERNAL MEDICINE

## 2020-01-08 PROCEDURE — 90471 IMMUNIZATION ADMIN: CPT | Mod: S$GLB,,, | Performed by: INTERNAL MEDICINE

## 2020-01-08 PROCEDURE — 90632 HEPATITIS A VACCINE ADULT IM: ICD-10-PCS | Mod: S$GLB,,, | Performed by: INTERNAL MEDICINE

## 2020-02-26 ENCOUNTER — OFFICE VISIT (OUTPATIENT)
Dept: ELECTROPHYSIOLOGY | Facility: CLINIC | Age: 30
End: 2020-02-26
Payer: COMMERCIAL

## 2020-02-26 VITALS
HEART RATE: 50 BPM | DIASTOLIC BLOOD PRESSURE: 76 MMHG | HEIGHT: 69 IN | SYSTOLIC BLOOD PRESSURE: 108 MMHG | WEIGHT: 134.06 LBS | BODY MASS INDEX: 19.86 KG/M2

## 2020-02-26 DIAGNOSIS — I47.10 SVT (SUPRAVENTRICULAR TACHYCARDIA): ICD-10-CM

## 2020-02-26 DIAGNOSIS — I49.3 PREMATURE VENTRICULAR CONTRACTIONS (PVCS) (VPCS): ICD-10-CM

## 2020-02-26 DIAGNOSIS — R00.2 PALPITATIONS: Primary | ICD-10-CM

## 2020-02-26 DIAGNOSIS — F90.1 ATTENTION DEFICIT HYPERACTIVITY DISORDER (ADHD), PREDOMINANTLY HYPERACTIVE TYPE: ICD-10-CM

## 2020-02-26 PROCEDURE — 99999 PR PBB SHADOW E&M-EST. PATIENT-LVL III: ICD-10-PCS | Mod: PBBFAC,,, | Performed by: INTERNAL MEDICINE

## 2020-02-26 PROCEDURE — 99215 OFFICE O/P EST HI 40 MIN: CPT | Mod: S$GLB,,, | Performed by: INTERNAL MEDICINE

## 2020-02-26 PROCEDURE — 3008F BODY MASS INDEX DOCD: CPT | Mod: CPTII,S$GLB,, | Performed by: INTERNAL MEDICINE

## 2020-02-26 PROCEDURE — 99215 PR OFFICE/OUTPT VISIT, EST, LEVL V, 40-54 MIN: ICD-10-PCS | Mod: S$GLB,,, | Performed by: INTERNAL MEDICINE

## 2020-02-26 PROCEDURE — 99999 PR PBB SHADOW E&M-EST. PATIENT-LVL III: CPT | Mod: PBBFAC,,, | Performed by: INTERNAL MEDICINE

## 2020-02-26 PROCEDURE — 3008F PR BODY MASS INDEX (BMI) DOCUMENTED: ICD-10-PCS | Mod: CPTII,S$GLB,, | Performed by: INTERNAL MEDICINE

## 2020-02-26 NOTE — PROGRESS NOTES
Subjective:    Patient ID:  Daphnie Vazquez is a 30 y.o. female who presents for follow-up of Tachycardia      HPI 31 yo female with SVT, PVC's, ADHD.  She is a PGY 4 Surgery resident. Has seen Dr. Jain.  Has had recurrent palpitations since childhood. Has been able to terminate them in the past with vagal maneuvers, but she has had increasing frequency and duration of events.  Was taking metoprolol PRN for PVC's. In the past this would provoke sustained palpitations.  Presented 11/14/19 with sustained palpitations. ECG revealed short RP SVT. Terminated with lopressor.  Placed on lopressor 12.5 mg BID. Higher doses has made her feel poorly (notes low blood pressure and dizziness).  ZIO patch 12/13/19: Sinus rhythm with rare PVCs and PACs however symptoms correlate with episodes of PVCs, at times in bigeminy/trigeminy. One episode of 12 beat nonsustained VT. 3 episodes of regular narrow complex tachycardia, likely short RP tachycardia.  Now occurring once a week. She can terminate them with vagal maneuvers.    Echo 8/8/19 normal biventricular structure and function.  ECG reveals no evidence of pre-excitation.    Review of Systems   Constitution: Negative. Negative for malaise/fatigue.   Cardiovascular: Positive for palpitations. Negative for chest pain, dyspnea on exertion, irregular heartbeat, leg swelling, near-syncope, orthopnea, paroxysmal nocturnal dyspnea and syncope.   Respiratory: Negative for cough and shortness of breath.    Neurological: Negative for dizziness, light-headedness and weakness.   All other systems reviewed and are negative.       Objective:    Physical Exam   Constitutional: She is oriented to person, place, and time. She appears well-developed and well-nourished.   Eyes: Conjunctivae are normal. No scleral icterus.   Neck: No JVD present. No tracheal deviation present.   Cardiovascular: Normal rate and regular rhythm. PMI is not displaced.   Pulmonary/Chest: Effort normal and breath  sounds normal. No respiratory distress.   Abdominal: Soft. There is no hepatosplenomegaly. There is no tenderness.   Musculoskeletal: She exhibits no edema or tenderness.   Neurological: She is alert and oriented to person, place, and time.   Skin: Skin is warm and dry. No rash noted.   Psychiatric: She has a normal mood and affect. Her behavior is normal.         Assessment:       1. Palpitations    2. SVT (supraventricular tachycardia)    3. Premature ventricular contractions (PVCs) (VPCs)    4. Attention deficit hyperactivity disorder (ADHD), predominantly hyperactive type         Plan:           SVT, with breakthrough on lopressor. Feels poorly on metoprolol.  Recommend RFA. Risks and benefits discussed, she would like to proceed.  Hold metoprolol 3 days prior.

## 2020-03-03 DIAGNOSIS — I47.10 SVT (SUPRAVENTRICULAR TACHYCARDIA): Primary | ICD-10-CM

## 2020-03-24 ENCOUNTER — TELEPHONE (OUTPATIENT)
Dept: ELECTROPHYSIOLOGY | Facility: CLINIC | Age: 30
End: 2020-03-24

## 2020-03-24 DIAGNOSIS — R00.2 PALPITATIONS: ICD-10-CM

## 2020-03-24 NOTE — TELEPHONE ENCOUNTER
Spoke with patient and advised that the [place] Dept of Health and the Governor have issued mandates the prohibit our physicians from performing any non-emergent/non-urgent procedures for at least the next 30 days. They will meet again on 4/21/2020.  Advised that we need to cancel her SVT RFA planned for 4/16/2020. She verbalizes understanding and would like Dr Haines's recommendations regarding her worsening SVT. She takes lopressor 12.5mg bid. She had an episode of SVT that lasted over an hour and didn't respond to normal vagal maneuvers. She wound up taking 37.5mg of lopressor to break it. She wants to get recommendations on what she can do until she can get rescheduled. Will call her back with recommendations.

## 2020-03-24 NOTE — TELEPHONE ENCOUNTER
"Discussed with Dr Haines. He wants to try having her take lopressor 12.5mg in am and 25mg in pm. If this doesn't work, he recommends trying flecainide as "pill in the pocket". The first time she takes it, she should only take 150mg. Max dose is 300mg daily. Spoke with patient and advised of recommendations. She verbalized understanding. Rx sent to [facility], as requested. She will keep me posted.   "

## 2020-03-25 RX ORDER — FLECAINIDE ACETATE 150 MG/1
TABLET ORAL
Qty: 60 TABLET | Refills: 11 | Status: SHIPPED | OUTPATIENT
Start: 2020-03-25 | End: 2020-06-23

## 2020-03-25 RX ORDER — METOPROLOL TARTRATE 25 MG/1
TABLET, FILM COATED ORAL
Qty: 45 TABLET | Refills: 11 | Status: SHIPPED | OUTPATIENT
Start: 2020-03-25 | End: 2020-06-23

## 2020-03-30 ENCOUNTER — TELEPHONE (OUTPATIENT)
Dept: ELECTROPHYSIOLOGY | Facility: CLINIC | Age: 30
End: 2020-03-30

## 2020-03-30 NOTE — TELEPHONE ENCOUNTER
----- Message from Lashay Colón MA sent at 3/30/2020  3:08 PM CDT -----  Contact: Patient      ----- Message -----  From: Nicole Lu  Sent: 3/30/2020   3:04 PM CDT  To: Yancy Duran Staff    Dahlia, The Pt is returning your call regarding her medication. Please call her back @ [phone]. Thanks, Nicole

## 2020-03-30 NOTE — TELEPHONE ENCOUNTER
"Spoke with patient. Rx's were sent to [facility], as requested. She was told they could not fill them because Dr Haines is not a "[facility] Doctor". Will send rx to [facility] in [place]. Called in to [phone], as requested by patient.   "

## 2020-04-21 DIAGNOSIS — Z01.84 ANTIBODY RESPONSE EXAMINATION: ICD-10-CM

## 2020-04-22 ENCOUNTER — TELEPHONE (OUTPATIENT)
Dept: CARDIOLOGY | Facility: CLINIC | Age: 30
End: 2020-04-22

## 2020-04-22 DIAGNOSIS — Z01.818 PRE-OP EVALUATION: Primary | ICD-10-CM

## 2020-04-22 NOTE — TELEPHONE ENCOUNTER
I spoke with Dr. Vazquez. She is continuing to have SVT, weekly, despite the beta blocker.  Offered proceeding with her ablation sooner than later given the frequency of events.  She would like to proceed.

## 2020-04-22 NOTE — TELEPHONE ENCOUNTER
Spoke with patient and scheduled SVT RFA for 5/5/2020. We reviewed the Covid restrictions. She verbalized understanding. She will get Covid test (drive through) on 5/4/20, early am. Orders are in and she was given the # to call to schedule. Instructions emailed to her for procedure, as requested and she understands to hold Metoprolol for 3 days prior to procedure.

## 2020-05-04 ENCOUNTER — LAB VISIT (OUTPATIENT)
Dept: INTERNAL MEDICINE | Facility: CLINIC | Age: 30
End: 2020-05-04
Payer: COMMERCIAL

## 2020-05-04 ENCOUNTER — TELEPHONE (OUTPATIENT)
Dept: ELECTROPHYSIOLOGY | Facility: CLINIC | Age: 30
End: 2020-05-04

## 2020-05-04 DIAGNOSIS — Z01.818 PRE-OP EVALUATION: ICD-10-CM

## 2020-05-04 LAB — SARS-COV-2 RNA RESP QL NAA+PROBE: NOT DETECTED

## 2020-05-04 PROCEDURE — U0002 COVID-19 LAB TEST NON-CDC: HCPCS

## 2020-05-04 NOTE — TELEPHONE ENCOUNTER
Left message for patient to return call to review pre-op instructions for SVT RFA tomorrow. She did have her Covid test done this morning, as planned. Reminded that she needed to hold metoprolol since 5/1/2020 dose. Call back # left for confirmation. Covid results pending.

## 2020-05-05 ENCOUNTER — ANESTHESIA EVENT (OUTPATIENT)
Dept: MEDSURG UNIT | Facility: HOSPITAL | Age: 30
End: 2020-05-05
Payer: COMMERCIAL

## 2020-05-05 ENCOUNTER — ANESTHESIA (OUTPATIENT)
Dept: MEDSURG UNIT | Facility: HOSPITAL | Age: 30
End: 2020-05-05
Payer: COMMERCIAL

## 2020-05-05 ENCOUNTER — HOSPITAL ENCOUNTER (OUTPATIENT)
Facility: HOSPITAL | Age: 30
Discharge: HOME | End: 2020-05-05
Attending: INTERNAL MEDICINE | Admitting: INTERNAL MEDICINE
Payer: COMMERCIAL

## 2020-05-05 VITALS
BODY MASS INDEX: 19.26 KG/M2 | HEIGHT: 69 IN | WEIGHT: 130 LBS | SYSTOLIC BLOOD PRESSURE: 109 MMHG | OXYGEN SATURATION: 100 % | HEART RATE: 63 BPM | DIASTOLIC BLOOD PRESSURE: 71 MMHG | RESPIRATION RATE: 16 BRPM | TEMPERATURE: 97 F

## 2020-05-05 DIAGNOSIS — I47.10 SVT (SUPRAVENTRICULAR TACHYCARDIA): ICD-10-CM

## 2020-05-05 DIAGNOSIS — I49.9 ARRHYTHMIA: ICD-10-CM

## 2020-05-05 LAB
ANION GAP SERPL CALC-SCNC: 9 MMOL/L (ref 8–16)
APTT BLDCRRT: 25.4 SEC (ref 21–32)
B-HCG UR QL: NEGATIVE
BASOPHILS # BLD AUTO: 0.02 K/UL (ref 0–0.2)
BASOPHILS NFR BLD: 0.4 % (ref 0–1.9)
BUN SERPL-MCNC: 14 MG/DL (ref 6–20)
CALCIUM SERPL-MCNC: 9.2 MG/DL (ref 8.7–10.5)
CHLORIDE SERPL-SCNC: 104 MMOL/L (ref 95–110)
CO2 SERPL-SCNC: 24 MMOL/L (ref 23–29)
CREAT SERPL-MCNC: 0.8 MG/DL (ref 0.5–1.4)
CTP QC/QA: YES
DIFFERENTIAL METHOD: NORMAL
EOSINOPHIL # BLD AUTO: 0.1 K/UL (ref 0–0.5)
EOSINOPHIL NFR BLD: 1.7 % (ref 0–8)
ERYTHROCYTE [DISTWIDTH] IN BLOOD BY AUTOMATED COUNT: 12.3 % (ref 11.5–14.5)
EST. GFR  (AFRICAN AMERICAN): >60 ML/MIN/1.73 M^2
EST. GFR  (NON AFRICAN AMERICAN): >60 ML/MIN/1.73 M^2
GLUCOSE SERPL-MCNC: 111 MG/DL (ref 70–110)
HCT VFR BLD AUTO: 40.9 % (ref 37–48.5)
HGB BLD-MCNC: 13.2 G/DL (ref 12–16)
IMM GRANULOCYTES # BLD AUTO: 0.02 K/UL (ref 0–0.04)
IMM GRANULOCYTES NFR BLD AUTO: 0.4 % (ref 0–0.5)
INR PPP: 1 (ref 0.8–1.2)
LYMPHOCYTES # BLD AUTO: 1.6 K/UL (ref 1–4.8)
LYMPHOCYTES NFR BLD: 30.8 % (ref 18–48)
MCH RBC QN AUTO: 30.8 PG (ref 27–31)
MCHC RBC AUTO-ENTMCNC: 32.3 G/DL (ref 32–36)
MCV RBC AUTO: 96 FL (ref 82–98)
MONOCYTES # BLD AUTO: 0.5 K/UL (ref 0.3–1)
MONOCYTES NFR BLD: 9.7 % (ref 4–15)
NEUTROPHILS # BLD AUTO: 3 K/UL (ref 1.8–7.7)
NEUTROPHILS NFR BLD: 57 % (ref 38–73)
NRBC BLD-RTO: 0 /100 WBC
PLATELET # BLD AUTO: 252 K/UL (ref 150–350)
PMV BLD AUTO: 9.7 FL (ref 9.2–12.9)
POTASSIUM SERPL-SCNC: 3.8 MMOL/L (ref 3.5–5.1)
PROTHROMBIN TIME: 10.3 SEC (ref 9–12.5)
RBC # BLD AUTO: 4.28 M/UL (ref 4–5.4)
SODIUM SERPL-SCNC: 137 MMOL/L (ref 136–145)
WBC # BLD AUTO: 5.17 K/UL (ref 3.9–12.7)

## 2020-05-05 PROCEDURE — 93613 INTRACARDIAC EPHYS 3D MAPG: CPT | Mod: ,,, | Performed by: INTERNAL MEDICINE

## 2020-05-05 PROCEDURE — 94761 N-INVAS EAR/PLS OXIMETRY MLT: CPT

## 2020-05-05 PROCEDURE — 93623 PRGRMD STIMJ&PACG IV RX NFS: CPT | Mod: 26,,, | Performed by: INTERNAL MEDICINE

## 2020-05-05 PROCEDURE — 93010 ELECTROCARDIOGRAM REPORT: CPT | Mod: ,,, | Performed by: INTERNAL MEDICINE

## 2020-05-05 PROCEDURE — 85730 THROMBOPLASTIN TIME PARTIAL: CPT

## 2020-05-05 PROCEDURE — 93653 PR ELECTROPHYS EVAL, COMPREHEN, W/SUPRAVENT TACHYCARD TRMT: ICD-10-PCS | Mod: ,,, | Performed by: INTERNAL MEDICINE

## 2020-05-05 PROCEDURE — 27201423 OPTIME MED/SURG SUP & DEVICES STERILE SUPPLY: Performed by: INTERNAL MEDICINE

## 2020-05-05 PROCEDURE — C1894 INTRO/SHEATH, NON-LASER: HCPCS | Performed by: INTERNAL MEDICINE

## 2020-05-05 PROCEDURE — 93010 EKG 12-LEAD: ICD-10-PCS | Mod: ,,, | Performed by: INTERNAL MEDICINE

## 2020-05-05 PROCEDURE — 63600175 PHARM REV CODE 636 W HCPCS: Performed by: NURSE ANESTHETIST, CERTIFIED REGISTERED

## 2020-05-05 PROCEDURE — D9220A PRA ANESTHESIA: Mod: CRNA,,, | Performed by: NURSE ANESTHETIST, CERTIFIED REGISTERED

## 2020-05-05 PROCEDURE — D9220A PRA ANESTHESIA: ICD-10-PCS | Mod: ANES,,, | Performed by: ANESTHESIOLOGY

## 2020-05-05 PROCEDURE — 85610 PROTHROMBIN TIME: CPT

## 2020-05-05 PROCEDURE — 81025 URINE PREGNANCY TEST: CPT | Performed by: INTERNAL MEDICINE

## 2020-05-05 PROCEDURE — S0028 INJECTION, FAMOTIDINE, 20 MG: HCPCS | Performed by: NURSE ANESTHETIST, CERTIFIED REGISTERED

## 2020-05-05 PROCEDURE — 93623 PRGRMD STIMJ&PACG IV RX NFS: CPT | Performed by: INTERNAL MEDICINE

## 2020-05-05 PROCEDURE — 93613 PR INTRACARD ELECTROPHYS 3-DIMENS MAPPING: ICD-10-PCS | Mod: ,,, | Performed by: INTERNAL MEDICINE

## 2020-05-05 PROCEDURE — D9220A PRA ANESTHESIA: Mod: ANES,,, | Performed by: ANESTHESIOLOGY

## 2020-05-05 PROCEDURE — 63600175 PHARM REV CODE 636 W HCPCS: Performed by: INTERNAL MEDICINE

## 2020-05-05 PROCEDURE — 37000008 HC ANESTHESIA 1ST 15 MINUTES: Performed by: INTERNAL MEDICINE

## 2020-05-05 PROCEDURE — 93621 COMP EP EVL L PAC&REC C SINS: CPT | Performed by: INTERNAL MEDICINE

## 2020-05-05 PROCEDURE — 80048 BASIC METABOLIC PNL TOTAL CA: CPT

## 2020-05-05 PROCEDURE — 93653 COMPRE EP EVAL TX SVT: CPT | Performed by: INTERNAL MEDICINE

## 2020-05-05 PROCEDURE — 25000003 PHARM REV CODE 250: Performed by: NURSE ANESTHETIST, CERTIFIED REGISTERED

## 2020-05-05 PROCEDURE — D9220A PRA ANESTHESIA: ICD-10-PCS | Mod: CRNA,,, | Performed by: NURSE ANESTHETIST, CERTIFIED REGISTERED

## 2020-05-05 PROCEDURE — 93653 COMPRE EP EVAL TX SVT: CPT | Mod: ,,, | Performed by: INTERNAL MEDICINE

## 2020-05-05 PROCEDURE — 85025 COMPLETE CBC W/AUTO DIFF WBC: CPT

## 2020-05-05 PROCEDURE — 93613 INTRACARDIAC EPHYS 3D MAPG: CPT | Performed by: INTERNAL MEDICINE

## 2020-05-05 PROCEDURE — 93623 PR STIM/PACING HEART POST IV DRUG INFU: ICD-10-PCS | Mod: 26,,, | Performed by: INTERNAL MEDICINE

## 2020-05-05 PROCEDURE — 93621: ICD-10-PCS | Mod: 26,,, | Performed by: INTERNAL MEDICINE

## 2020-05-05 PROCEDURE — 93005 ELECTROCARDIOGRAM TRACING: CPT

## 2020-05-05 PROCEDURE — 93621 COMP EP EVL L PAC&REC C SINS: CPT | Mod: 26,,, | Performed by: INTERNAL MEDICINE

## 2020-05-05 PROCEDURE — C1733 CATH, EP, OTHR THAN COOL-TIP: HCPCS | Performed by: INTERNAL MEDICINE

## 2020-05-05 PROCEDURE — 37000009 HC ANESTHESIA EA ADD 15 MINS: Performed by: INTERNAL MEDICINE

## 2020-05-05 PROCEDURE — 25000003 PHARM REV CODE 250: Performed by: INTERNAL MEDICINE

## 2020-05-05 PROCEDURE — C1730 CATH, EP, 19 OR FEW ELECT: HCPCS | Performed by: INTERNAL MEDICINE

## 2020-05-05 RX ORDER — PROPOFOL 10 MG/ML
VIAL (ML) INTRAVENOUS
Status: DISCONTINUED | OUTPATIENT
Start: 2020-05-05 | End: 2020-05-05

## 2020-05-05 RX ORDER — ACETAMINOPHEN 325 MG/1
650 TABLET ORAL EVERY 6 HOURS PRN
Status: DISCONTINUED | OUTPATIENT
Start: 2020-05-05 | End: 2020-05-05 | Stop reason: HOSPADM

## 2020-05-05 RX ORDER — SODIUM CHLORIDE 9 MG/ML
INJECTION, SOLUTION INTRAVENOUS CONTINUOUS PRN
Status: DISCONTINUED | OUTPATIENT
Start: 2020-05-05 | End: 2020-05-05

## 2020-05-05 RX ORDER — PROPOFOL 10 MG/ML
VIAL (ML) INTRAVENOUS CONTINUOUS PRN
Status: DISCONTINUED | OUTPATIENT
Start: 2020-05-05 | End: 2020-05-05

## 2020-05-05 RX ORDER — SODIUM CHLORIDE 0.9 % (FLUSH) 0.9 %
10 SYRINGE (ML) INJECTION
Status: DISCONTINUED | OUTPATIENT
Start: 2020-05-05 | End: 2020-05-05 | Stop reason: HOSPADM

## 2020-05-05 RX ORDER — ONDANSETRON 2 MG/ML
INJECTION INTRAMUSCULAR; INTRAVENOUS
Status: DISCONTINUED | OUTPATIENT
Start: 2020-05-05 | End: 2020-05-05

## 2020-05-05 RX ORDER — MIDAZOLAM HYDROCHLORIDE 1 MG/ML
INJECTION, SOLUTION INTRAMUSCULAR; INTRAVENOUS
Status: DISCONTINUED | OUTPATIENT
Start: 2020-05-05 | End: 2020-05-05

## 2020-05-05 RX ORDER — FAMOTIDINE 10 MG/ML
INJECTION INTRAVENOUS
Status: DISCONTINUED | OUTPATIENT
Start: 2020-05-05 | End: 2020-05-05

## 2020-05-05 RX ORDER — DEXAMETHASONE SODIUM PHOSPHATE 4 MG/ML
INJECTION, SOLUTION INTRA-ARTICULAR; INTRALESIONAL; INTRAMUSCULAR; INTRAVENOUS; SOFT TISSUE
Status: DISCONTINUED | OUTPATIENT
Start: 2020-05-05 | End: 2020-05-05

## 2020-05-05 RX ORDER — LIDOCAINE HYDROCHLORIDE 20 MG/ML
INJECTION, SOLUTION INFILTRATION; PERINEURAL
Status: DISCONTINUED | OUTPATIENT
Start: 2020-05-05 | End: 2020-05-05 | Stop reason: HOSPADM

## 2020-05-05 RX ORDER — HYDROMORPHONE HYDROCHLORIDE 1 MG/ML
0.2 INJECTION, SOLUTION INTRAMUSCULAR; INTRAVENOUS; SUBCUTANEOUS EVERY 5 MIN PRN
Status: DISCONTINUED | OUTPATIENT
Start: 2020-05-05 | End: 2020-05-05 | Stop reason: HOSPADM

## 2020-05-05 RX ORDER — HEPARIN SODIUM 200 [USP'U]/100ML
INJECTION, SOLUTION INTRAVENOUS
Status: DISCONTINUED | OUTPATIENT
Start: 2020-05-05 | End: 2020-05-05 | Stop reason: HOSPADM

## 2020-05-05 RX ADMIN — ONDANSETRON 4 MG: 2 INJECTION INTRAMUSCULAR; INTRAVENOUS at 09:05

## 2020-05-05 RX ADMIN — SODIUM CHLORIDE, SODIUM GLUCONATE, SODIUM ACETATE, POTASSIUM CHLORIDE, MAGNESIUM CHLORIDE, SODIUM PHOSPHATE, DIBASIC, AND POTASSIUM PHOSPHATE: .53; .5; .37; .037; .03; .012; .00082 INJECTION, SOLUTION INTRAVENOUS at 07:05

## 2020-05-05 RX ADMIN — PROPOFOL 50 MG: 10 INJECTION, EMULSION INTRAVENOUS at 07:05

## 2020-05-05 RX ADMIN — MIDAZOLAM 2 MG: 1 INJECTION INTRAMUSCULAR; INTRAVENOUS at 09:05

## 2020-05-05 RX ADMIN — PROPOFOL 150 MCG/KG/MIN: 10 INJECTION, EMULSION INTRAVENOUS at 07:05

## 2020-05-05 RX ADMIN — ISOPROTERENOL HYDROCHLORIDE 1 MCG/MIN: 0.2 INJECTION, SOLUTION INTRAMUSCULAR; INTRAVENOUS at 08:05

## 2020-05-05 RX ADMIN — SODIUM CHLORIDE: 0.9 INJECTION, SOLUTION INTRAVENOUS at 09:05

## 2020-05-05 RX ADMIN — PROPOFOL 30 MG: 10 INJECTION, EMULSION INTRAVENOUS at 08:05

## 2020-05-05 RX ADMIN — FAMOTIDINE 20 MG: 10 INJECTION, SOLUTION INTRAVENOUS at 09:05

## 2020-05-05 RX ADMIN — SODIUM CHLORIDE, SODIUM GLUCONATE, SODIUM ACETATE, POTASSIUM CHLORIDE, MAGNESIUM CHLORIDE, SODIUM PHOSPHATE, DIBASIC, AND POTASSIUM PHOSPHATE: .53; .5; .37; .037; .03; .012; .00082 INJECTION, SOLUTION INTRAVENOUS at 08:05

## 2020-05-05 RX ADMIN — DEXAMETHASONE SODIUM PHOSPHATE 8 MG: 4 INJECTION, SOLUTION INTRAMUSCULAR; INTRAVENOUS at 09:05

## 2020-05-05 RX ADMIN — MIDAZOLAM 2 MG: 1 INJECTION INTRAMUSCULAR; INTRAVENOUS at 07:05

## 2020-05-05 NOTE — PROGRESS NOTES
Pt DC'd per MD order. Discharge instructions given including activity, wound care, S&S of infections, future appointments, and when to call MD. Medications reviewed including when to take next dose. Telemetry and PIV DC'd, catheter tip intact. Pt awaiting transport.

## 2020-05-05 NOTE — H&P
Electrophysiology Pre Procedure H&P    HPI:   Daphnie Vazquez is a 30 y.o. female with symptomatic SVT refractory to beta blockers who [place] [facility] [place] +/- RFA for SVT. She has no fever or chills. She has symptomatic SVT at least once weekly. She tries vagal maneuvers in addition to extra metoprolol for episodes. Has been off of metoprolol since Friday. Also has symptomatic VPCs. Feels presyncopal with profound SVT episodes. No syncope however.     Past Medical History:   Diagnosis Date    Abnormal Pap smear of cervix     LEEP    ADHD (attention deficit hyperactivity disorder)     Raynaud phenomenon     SVT (supraventricular tachycardia)     Thyroid disease     Vaccine for human papilloma virus (HPV) types 6, 11, 16, and 18 administered 2009        Social History     Socioeconomic History    Marital status: Single     Spouse name: Not on file    Number of children: Not on file    Years of education: Not on file    Highest education level: Not on file   Occupational History    Not on file   Social Needs    Financial resource strain: Not on file    Food insecurity:     Worry: Not on file     Inability: Not on file    Transportation needs:     Medical: Not on file     Non-medical: Not on file   Tobacco Use    Smoking status: Never Smoker    Smokeless tobacco: Never Used   Substance and Sexual Activity    Alcohol use: Yes     Comment: occasionally     Drug use: No    Sexual activity: Yes     Partners: Male     Birth control/protection: Implant     Comment: Nexplanon 2015   Lifestyle    Physical activity:     Days per week: Not on file     Minutes per session: Not on file    Stress: Not on file   Relationships    Social connections:     Talks on phone: Not on file     Gets together: Not on file     Attends Yazidi service: Not on file     Active member of club or organization: Not on file     Attends meetings of clubs or organizations: Not on file     Relationship  "status: Not on file   Other Topics Concern    Not on file   Social History Narrative    Not on file        Family History   Problem Relation Age of Onset    Hypertension Father     Macular degeneration Maternal Grandfather     Breast cancer Neg Hx     Colon cancer Neg Hx     Ovarian cancer Neg Hx     Amblyopia Neg Hx     Blindness Neg Hx     Cataracts Neg Hx     Glaucoma Neg Hx     Retinal detachment Neg Hx     Strabismus Neg Hx         No current facility-administered medications for this encounter.         Constitutional: (-)fevers, (-)chills, (-)night sweats  HEENT: (-) headaches (-) lightheadedness (-) blurry vision  Cardiovascular: (-)chest pain (-)paroxysmal nocturnal dyspnea (-)orthopnea  Respiratory: (-)shortness of breath, (-)dyspnea on exertion (-)hemoptysis  Gastrointestinal: (-)abdominal pain (-)nausea (-)vomiting (-)tarry stools  Musculoskeletal: (-)arthralgias (-)limited range of motion  Neurologic: (-)parasthesias (-)mood disorder (-)anxiety  Endo: (-)polyuria (-)polydipsia (-)heat/cold intolerance  Skin: (-)rash     Vitals:    05/05/20 0631 05/05/20 0632   BP: 113/77 118/70   BP Location: Left arm Right arm   Patient Position: Lying Lying   Pulse: 61    Resp: 16    Temp: 98 °F (36.7 °C)    TempSrc: Oral    SpO2: 98%    Weight: 59 kg (130 lb)    Height: 5' 9" (1.753 m)      Physical Exam:   Gen: no acute distress, pleasant patient answering questions appropriately  HEENT: extra-ocular muscles intact, normocephalic-atraumatic  Neck: no jugular venous distension  CVS: regular rate and rhythm  CHEST: non labored  ABD: Soft  EXT: No Edema posterior tibial)  NEURO: awake, alert, and oriented   Skin: No rash     Review of Labs:   No results found for: INR, PROTIME  Lab Results   Component Value Date    WBC 7.66 11/14/2019    HGB 14.4 11/14/2019    HCT 42.8 11/14/2019    MCV 89 11/14/2019     11/14/2019       CMP  Sodium   Date Value Ref Range Status   11/14/2019 140 136 - 145 mmol/L " Final     Potassium   Date Value Ref Range Status   11/14/2019 3.8 3.5 - 5.1 mmol/L Final     Chloride   Date Value Ref Range Status   11/14/2019 106 95 - 110 mmol/L Final     CO2   Date Value Ref Range Status   11/14/2019 22 (L) 23 - 29 mmol/L Final     Glucose   Date Value Ref Range Status   11/14/2019 86 70 - 110 mg/dL Final     BUN, Bld   Date Value Ref Range Status   11/14/2019 10 6 - 20 mg/dL Final     Creatinine   Date Value Ref Range Status   11/14/2019 0.8 0.5 - 1.4 mg/dL Final     Calcium   Date Value Ref Range Status   11/14/2019 10.0 8.7 - 10.5 mg/dL Final     Total Protein   Date Value Ref Range Status   11/14/2019 8.2 6.0 - 8.4 g/dL Final     Albumin   Date Value Ref Range Status   11/14/2019 4.6 3.5 - 5.2 g/dL Final     Total Bilirubin   Date Value Ref Range Status   11/14/2019 0.4 0.1 - 1.0 mg/dL Final     Comment:     For infants and newborns, interpretation of results should be based  on gestational age, weight and in agreement with clinical  observations.  Premature Infant recommended reference ranges:  Up to 24 hours.............<8.0 mg/dL  Up to 48 hours............<12.0 mg/dL  3-5 days..................<15.0 mg/dL  6-29 days.................<15.0 mg/dL       Alkaline Phosphatase   Date Value Ref Range Status   11/14/2019 67 55 - 135 U/L Final     AST   Date Value Ref Range Status   11/14/2019 24 10 - 40 U/L Final     ALT   Date Value Ref Range Status   11/14/2019 16 10 - 44 U/L Final     Anion Gap   Date Value Ref Range Status   11/14/2019 12 8 - 16 mmol/L Final     eGFR if    Date Value Ref Range Status   11/14/2019 >60 >60 mL/min/1.73 m^2 Final     eGFR if non    Date Value Ref Range Status   11/14/2019 >60 >60 mL/min/1.73 m^2 Final     Comment:     Calculation used to obtain the estimated glomerular filtration  rate (eGFR) is the CKD-EPI equation.        Assessment/Plan:  Daphnie Vazquez is a 30 y.o. female with symptomatic SVT refractory to beta blockers  who [facility] elective EPS +/- RFA for SVT.  Consent confirmed in chart.   We discussed the alternatives, benefits and risks of the procedure including pain, infection, bleeding, injury to veins and arteries including aneurysms and fistulas, injury to heart valves, puncture of the heart leading to pericardial effusion or tamponade requiring tube drainage, atrial-esophageal fistula, heart attack, stroke and death.

## 2020-05-05 NOTE — DISCHARGE SUMMARY
[facility]  Cardiac Electrophysiology  Discharge Summary      Patient Name: Daphnie Vazquez  MRN: [id number]  Admission Date: 5/5/2020  Hospital Length of Stay: 0 days  Discharge Date and Time: No discharge date for patient encounter.  Attending Physician: Roger Haines MD   Discharging Provider: Nabeel Goncalves MD  Primary Care Physician: Primary Doctor No    HPI:   Daphnie Vazquez is a 30 y.o. female with symptomatic SVT refractory to beta blockers who presented to [facility] for outpatient elective EPS +/- RFA for SVT.  Procedure(s) (LRB):  Ablation, SVT, Accessory Pathway (N/A)     Indwelling Lines/Drains at time of discharge:  Lines/Drains/Airways     None                 Hospital Course:  AVNRT easily induced during EPS. Slow pathway modification performed. Unable to re-induce post ablation.     Significant Diagnostic Studies: Labs:   BMP:   Recent Labs   Lab 05/05/20  0630   *      K 3.8      CO2 24   BUN 14   CREATININE 0.8   CALCIUM 9.2   , CMP   Recent Labs   Lab 05/05/20  0630      K 3.8      CO2 24   *   BUN 14   CREATININE 0.8   CALCIUM 9.2   ANIONGAP 9   ESTGFRAFRICA >60.0   EGFRNONAA >60.0   , CBC   Recent Labs   Lab 05/05/20  0630   WBC 5.17   HGB 13.2   HCT 40.9       and INR   Lab Results   Component Value Date    INR 1.0 05/05/2020       Pending Diagnostic Studies:     None          Final Active Diagnoses:    Diagnosis Date Noted POA    SVT (supraventricular tachycardia) [I47.1] 02/26/2020 Yes      Problems Resolved During this Admission:     No new Assessment & Plan notes have been filed under this hospital service since the last note was generated.  Service: Arrhythmia      Discharged Condition: good    Disposition: Home or Self Care    Follow Up:  Follow-up Information     Roger Haines MD In 6 weeks.    Specialties:  Electrophysiology, Cardiology  Contact information:  [address]  [facility] [address] [phone]                  Patient Instructions:   No discharge procedures on file.  Medications:  Reconciled Home Medications:      Medication List      CONTINUE taking these medications    atovaquone-proguaniL 250-100 mg Tab  Commonly known as:  MALARONE  Take 1 tablet by mouth once daily Take 1-2 days prior to first exposure and for 7 days following exposure.     azithromycin 500 MG tablet  Commonly known as:  ZITHROMAX  Take 1 tablet (500 mg total) by mouth once daily. Take once daily for 3 days if develop symptoms.     flecainide 150 MG Tab  Commonly known as:  TAMBOCOR  Take 300mg once a day as needed for SVT     metoprolol tartrate 25 MG tablet  Commonly known as:  LOPRESSOR  Take 12.5mg in am and 25mg in pm     NEXPLANON 68 mg Impl subdermal device  Generic drug:  etonogestreL  by Subdermal route.     ondansetron 8 MG Tbdl  Commonly known as:  ZOFRAN-ODT  TAKE ONE TABLET BY MOUTH EVERY 8 HOURS AS NEEDED FOR NAUSEA.            Time spent on the discharge of patient: 30 minutes    Nabeel Goncalves MD  Cardiac Electrophysiology  [facility]

## 2020-05-05 NOTE — PROGRESS NOTES
Patient ambulated in hallway with standby assist. Bilateral groins remained CDI. No bleeding or hematoma noted. Some mild pain noted to L groin. Patient denied need for pain medication.

## 2020-05-05 NOTE — TRANSFER OF CARE
"Anesthesia Transfer of Care Note    Patient: Daphnie Vazquez    Procedure(s) Performed: Procedure(s) (LRB):  Ablation, SVT, Accessory Pathway (N/A)    Patient location: PACU    Anesthesia Type: general    Transport from OR: Transported from OR on 6-10 L/min O2 by face mask with adequate spontaneous ventilation    Post pain: adequate analgesia    Post assessment: no apparent anesthetic complications and tolerated procedure well    Post vital signs: stable    Level of consciousness: sedated    Nausea/Vomiting: no nausea/vomiting    Complications: none    Transfer of care protocol was followed      Last vitals:   Visit Vitals  BP 92/65   Pulse 67   Temp 36.7 °C (98 °F) (Oral)   Resp 19   Ht 5' 9" (1.753 m)   Wt 59 kg (130 lb)   LMP 04/28/2020 (Approximate)   SpO2 100%   Breastfeeding? No   BMI 19.20 kg/m²     "

## 2020-05-05 NOTE — PLAN OF CARE
Received report from Faith. Patient s/p RFA, AAOx3. VSS, no c/o pain or discomfort at this time, resp even and unlabored. Gauze/tegaderm dressing to bilateral groin is CDI. No active bleeding. No hematoma noted. Post procedure protocol reviewed with patient. Understanding verbalized. Nurse call bell within reach. Will continue to monitor per post procedure protocol.

## 2020-05-05 NOTE — BRIEF OP NOTE
: Roger Haines MD    Post-operative Diagnosis: AVNRT    Procedure Performed: RFA to the region of the slow pathway for treatment of AVNRT.     Description of Procedure: The patient was brought to the EP lab in the fasting state. Prepped and draped in sterile fashion. Safety timeout was performed. Sedation administered by anesthesia staff. Ultrasound guided venous access of the bilateral femoral veins was performed. HRA, HIS, and RV catheters placed via left femoral vein access. CS and Ablation catheters via right femoral vein access. Diagnostic EP study confirmed AVNRT. RFA to the slow pathway for treatment of AVNRT. Non inducible at end of study.     EBL: <10 mL    Specimens Removed: None  Complications: no immediate

## 2020-05-05 NOTE — ANESTHESIA PREPROCEDURE EVALUATION
05/05/2020  Pre-operative evaluation for Procedure(s) (LRB):  Ablation, SVT, Accessory Pathway (N/A)    Daphnie Vazquez is a 30 y.o. female hx of significant GERD    Patient Active Problem List   Diagnosis    Thyroid nodule    Attention deficit hyperactivity disorder (ADHD), predominantly hyperactive type    History of abnormal cervical Pap smear    Palpitations    SVT (supraventricular tachycardia)    Premature ventricular contractions (PVCs) (VPCs)       Review of patient's allergies indicates:  No Known Allergies    No current facility-administered medications on file prior to encounter.      Current Outpatient Medications on File Prior to Encounter   Medication Sig Dispense Refill    etonogestrel (NEXPLANON) 68 mg Impl by Subdermal route.      atovaquone-proguanil (MALARONE) 250-100 mg Tab Take 1 tablet by mouth once daily Take 1-2 days prior to first exposure and for 7 days following exposure. (Patient not taking: Reported on 2/26/2020) 25 tablet 0    azithromycin (ZITHROMAX) 500 MG tablet Take 1 tablet (500 mg total) by mouth once daily. Take once daily for 3 days if develop symptoms. (Patient not taking: Reported on 2/26/2020) 3 tablet 0    ondansetron (ZOFRAN-ODT) 8 MG TbDL TAKE ONE TABLET BY MOUTH EVERY 8 HOURS AS NEEDED FOR NAUSEA. 20 tablet 0       Past Surgical History:   Procedure Laterality Date    AUGMENTATION OF BREAST      BREAST SURGERY      augmentation    CERVICAL BIOPSY  W/ LOOP ELECTRODE EXCISION  age 17    Right hemithyroidectomy         Social History     Socioeconomic History    Marital status: Single     Spouse name: Not on file    Number of children: Not on file    Years of education: Not on file    Highest education level: Not on file   Occupational History    Not on file   Social Needs    Financial resource strain: Not on file    Food insecurity:      Worry: Not on file     Inability: Not on file    Transportation needs:     Medical: Not on file     Non-medical: Not on file   Tobacco Use    Smoking status: Never Smoker    Smokeless tobacco: Never Used   Substance and Sexual Activity    Alcohol use: Yes     Comment: occasionally     Drug use: No    Sexual activity: Yes     Partners: Male     Birth control/protection: Implant     Comment: Nexplanon    Lifestyle    Physical activity:     Days per week: Not on file     Minutes per session: Not on file    Stress: Not on file   Relationships    Social connections:     Talks on phone: Not on file     Gets together: Not on file     Attends [employer]ism service: Not on file     Active member of club or organization: Not on file     Attends meetings of clubs or organizations: Not on file     Relationship status: Not on file   Other Topics Concern    Not on file   Social History Narrative    Not on file         CBC: No results for input(s): WBC, RBC, HGB, HCT, PLT, MCV, MCH, MCHC in the last 72 hours.    CMP: No results for input(s): NA, K, CL, CO2, BUN, CREATININE, GLU, MG, PHOS, CALCIUM, ALBUMIN, PROT, ALKPHOS, ALT, AST, BILITOT in the last 72 hours.    INR  No results for input(s): PT, INR, PROTIME, APTT in the last 72 hours.        Diagnostic Studies:      EKD Echo:  No results found for this or any previous visit.      Anesthesia Evaluation    I have reviewed the Patient Summary Reports.     I have reviewed the Nursing Notes.   I have reviewed the Medications.     Review of Systems  Anesthesia Hx:  No problems with previous Anesthesia  History of prior surgery of interest to airway management or planning: Denies Family Hx of Anesthesia complications.   Denies Personal Hx of Anesthesia complications.   Hematology/Oncology:         -- Denies Anemia:   Cardiovascular:   Exercise tolerance: good Denies Hypertension. Dysrhythmias  ECG has been reviewed.    Pulmonary:   Denies COPD.  Denies Asthma.   Denies Sleep Apnea.    Renal/:   Denies Chronic Renal Disease.     Hepatic/GI:   GERD, well controlled Denies Liver Disease.    Neurological:   Denies CVA. Denies Seizures.    Endocrine:   Denies Diabetes.        Physical Exam  General:  Well nourished    Airway/Jaw/Neck:  Airway Findings: Mouth Opening: Normal Tongue: Normal  General Airway Assessment: Adult  Mallampati: II  Improves to II with phonation.  TM Distance: Normal, at least 6 cm  Jaw/Neck Findings:  Neck ROM: Normal ROM      Dental:  Dental Findings: In tact   Chest/Lungs:  Chest/Lungs Findings: Clear to auscultation, Normal Respiratory Rate     Heart/Vascular:  Heart Findings: Rate: Normal  Rhythm: Regular Rhythm  Sounds: Normal        Mental Status:  Mental Status Findings:  Cooperative, Alert and Oriented         Anesthesia Plan  Type of Anesthesia, risks & benefits discussed:  Anesthesia Type:  general  Patient's Preference:   Intra-op Monitoring Plan: standard ASA monitors  Intra-op Monitoring Plan Comments:   Post Op Pain Control Plan: per primary service following discharge from PACU  Post Op Pain Control Plan Comments:   Induction:   IV  Beta Blocker:  Patient is on a Beta-Blocker and has received one dose within the past 24 hours (No further documentation required).       Informed Consent: Patient understands risks and agrees with Anesthesia plan.  Questions answered. Anesthesia consent signed with patient.  ASA Score: 2     Day of Surgery Review of History & Physical:    H&P update referred to the provider.         Ready For Surgery From Anesthesia Perspective.

## 2020-05-05 NOTE — NURSING TRANSFER
Nursing Transfer Note      5/5/2020     Transfer SSU 3    Transfer via strecher    Transfer with RN    Transported by RN    Medicines sent: NA    Chart send with patient: YES    Notified: Spouse    Patient reassessed at: 05/05/20 @9540

## 2020-05-18 ENCOUNTER — TELEPHONE (OUTPATIENT)
Dept: ELECTROPHYSIOLOGY | Facility: CLINIC | Age: 30
End: 2020-05-18

## 2020-05-21 DIAGNOSIS — Z01.84 ANTIBODY RESPONSE EXAMINATION: ICD-10-CM

## 2020-06-20 DIAGNOSIS — Z01.84 ANTIBODY RESPONSE EXAMINATION: ICD-10-CM

## 2020-06-22 NOTE — PROGRESS NOTES
Ms. Vazquez is a patient of Dr. Haines and was last seen in clinic 2/26/2020.      Subjective:   Patient ID:  Daphnie Vazquez is a 30 y.o. female who presents for follow-up of No chief complaint on file.  .     HPI:    The patient location is: Work  The chief complaint leading to consultation is: Ablation follow up    Visit type: audiovisual    Face to Face time with patient: 15  20 minutes of total time spent on the encounter, which includes face to face time and non-face to face time preparing to see the patient (eg, review of tests), Obtaining and/or reviewing separately obtained history, Documenting clinical information in the electronic or other health record, Independently interpreting results (not separately reported) and communicating results to the patient/family/caregiver, or Care coordination (not separately reported).   Each patient to whom he or she provides medical services by telemedicine is:  (1) informed of the relationship between the physician and patient and the respective role of any other health care provider with respect to management of the patient; and (2) notified that he or she may decline to receive medical services by telemedicine and may withdraw from such care at any time.    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 female with SVT, PVC's, ADHD with a virtual visit s/p SVT ablation.     Background:    She is a PGY 4 Surgery resident. Has seen Dr. Jain.  Has had recurrent palpitations since childhood. Has been able to terminate them in the past with vagal maneuvers, but she has had increasing frequency and duration of events.  Was taking metoprolol PRN for PVC's. In the past this would provoke sustained palpitations.  Presented 11/14/19 with sustained palpitations. ECG revealed short RP SVT. Terminated with lopressor.  Placed on lopressor 12.5 mg BID. Higher doses has made her feel poorly (notes low blood pressure and dizziness).  ZIO patch 12/13/19: Sinus rhythm with rare PVCs and  "PACs however symptoms correlate with episodes of PVCs, at times in bigeminy/trigeminy. One episode of 12 beat nonsustained VT. 3 episodes of regular narrow complex tachycardia, likely short RP tachycardia.  Now occurring once a week. She can terminate them with vagal maneuvers.    Echo 8/8/19 normal biventricular structure and function.  ECG reveals no evidence of pre-excitation.  SVT, with breakthrough on lopressor. Feels poorly on metoprolol.  Recommend RFA.    Update (06/23/2020):    Ablation deferred due to COVID. Started patient on lopressor 12.5mg in am and 25mg in pm. If this doesn't work, try flecainide as "pill in the pocket".   Patient continued to have breakthrough episodes.     5/5/2020: AVNRT easily induced during EPS. Slow pathway modification performed. Unable to re-induce post ablation.     Today she reports only one episode that felt like her previous SVT that lasted 5 seconds. Otherwise she notes her usual PVCs, which are triggered with coffee, sleep deprivation, and stress. She is not taking metoprolol or flecainide at this time. Otherwise no complaints. Ms. Vazquez denies chest pain with exertion or at rest, SOB, TAYLOR, dizziness, or syncope.      Recent Cardiac Tests:    2D Echo (8/8/2019):  · Normal left ventricular systolic function. The estimated ejection fraction is 60-65%  · No wall motion abnormalities.  · Normal LV diastolic function.  · Normal right ventricular systolic function.  · The estimated PA systolic pressure is 27 mm Hg  · Normal central venous pressure (3 mm Hg).    Current Outpatient Medications   Medication Sig    etonogestrel (NEXPLANON) 68 mg Impl by Subdermal route.     Current Facility-Administered Medications   Medication    etonogestrel Impl 68 mg       Review of Systems   Constitution: Negative for malaise/fatigue.   Cardiovascular: Positive for irregular heartbeat and palpitations. Negative for chest pain, dyspnea on exertion and leg swelling.   Respiratory: " Negative for shortness of breath.    Hematologic/Lymphatic: Negative for bleeding problem.   Skin: Negative for rash.   Musculoskeletal: Negative for myalgias.   Gastrointestinal: Negative for hematemesis, hematochezia and nausea.   Genitourinary: Negative for hematuria.   Neurological: Negative for light-headedness.   Psychiatric/Behavioral: Negative for altered mental status.   Allergic/Immunologic: Negative for persistent infections.     Objective:        There were no vitals taken for this visit. - NA virtual visit    Physical Exam - NA virtual visit      Lab Results   Component Value Date     05/05/2020    K 3.8 05/05/2020    BUN 14 05/05/2020    CREATININE 0.8 05/05/2020    ALT 16 11/14/2019    AST 24 11/14/2019    HGB 13.2 05/05/2020    HCT 40.9 05/05/2020    TSH 1.508 11/14/2019       Recent Labs   Lab 05/05/20  0630   INR 1.0       Assessment:     1. SVT (supraventricular tachycardia)    2. Premature ventricular contractions (PVCs) (VPCs)    3. 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   She is now 6 weeks s/p slow pathway modification. She is doing well, with no sustained episodes since her procedure. She feels substantially better and is able to exercise once more. She is experiencing PVCs, associated with coffee, sleep deprivation, and stress. I offered Holter to evaluate burden but she deferred for now, saying this felt no different from when she had Holters in the past and her burden is not high. I suggested she could try her metoprolol but she said it made her too drowsy. I offered a trial of low dose verapamil. She deferred for now but will contact clinic if her PVCs become too bothersome.    Consider verapamil if needed.  Otherwise RTC PRN.    *A copy of this note has been sent to Dr. Haines*    Follow up if symptoms worsen or fail to  improve.    ------------------------------------------------------------------    NELSON Ashton, NP-C  Cardiac Electrophysiology

## 2020-06-23 ENCOUNTER — OFFICE VISIT (OUTPATIENT)
Dept: ELECTROPHYSIOLOGY | Facility: CLINIC | Age: 30
End: 2020-06-23
Payer: COMMERCIAL

## 2020-06-23 DIAGNOSIS — I47.10 SVT (SUPRAVENTRICULAR TACHYCARDIA): Primary | ICD-10-CM

## 2020-06-23 DIAGNOSIS — R00.2 PALPITATIONS: ICD-10-CM

## 2020-06-23 DIAGNOSIS — I49.3 PREMATURE VENTRICULAR CONTRACTIONS (PVCS) (VPCS): ICD-10-CM

## 2020-06-23 PROCEDURE — 99213 PR OFFICE/OUTPT VISIT, EST, LEVL III, 20-29 MIN: ICD-10-PCS | Mod: 95,,, | Performed by: NURSE PRACTITIONER

## 2020-06-23 PROCEDURE — 99213 OFFICE O/P EST LOW 20 MIN: CPT | Mod: 95,,, | Performed by: NURSE PRACTITIONER

## 2020-07-02 ENCOUNTER — CLINICAL SUPPORT (OUTPATIENT)
Dept: URGENT CARE | Facility: CLINIC | Age: 30
End: 2020-07-02
Payer: COMMERCIAL

## 2020-07-02 VITALS — TEMPERATURE: 99 F | HEART RATE: 82 BPM | OXYGEN SATURATION: 98 %

## 2020-07-02 PROCEDURE — U0003 INFECTIOUS AGENT DETECTION BY NUCLEIC ACID (DNA OR RNA); SEVERE ACUTE RESPIRATORY SYNDROME CORONAVIRUS 2 (SARS-COV-2) (CORONAVIRUS DISEASE [COVID-19]), AMPLIFIED PROBE TECHNIQUE, MAKING USE OF HIGH THROUGHPUT TECHNOLOGIES AS DESCRIBED BY CMS-2020-01-R: HCPCS

## 2020-07-06 ENCOUNTER — TELEPHONE (OUTPATIENT)
Dept: URGENT CARE | Facility: CLINIC | Age: 30
End: 2020-07-06

## 2020-07-06 LAB — SARS-COV-2 RNA RESP QL NAA+PROBE: NEGATIVE

## 2020-07-20 DIAGNOSIS — Z01.84 ANTIBODY RESPONSE EXAMINATION: ICD-10-CM

## 2020-08-19 DIAGNOSIS — Z01.84 ANTIBODY RESPONSE EXAMINATION: ICD-10-CM

## 2020-09-15 ENCOUNTER — OFFICE VISIT (OUTPATIENT)
Dept: PSYCHIATRY | Facility: CLINIC | Age: 30
End: 2020-09-15
Payer: COMMERCIAL

## 2020-09-15 VITALS
HEIGHT: 69 IN | BODY MASS INDEX: 19.76 KG/M2 | WEIGHT: 133.38 LBS | TEMPERATURE: 98 F | SYSTOLIC BLOOD PRESSURE: 130 MMHG | DIASTOLIC BLOOD PRESSURE: 73 MMHG | HEART RATE: 81 BPM | RESPIRATION RATE: 18 BRPM

## 2020-09-15 DIAGNOSIS — F90.0 ATTENTION DEFICIT HYPERACTIVITY DISORDER (ADHD), PREDOMINANTLY INATTENTIVE TYPE: Primary | ICD-10-CM

## 2020-09-15 PROCEDURE — 90792 PSYCH DIAG EVAL W/MED SRVCS: CPT | Mod: S$GLB,,, | Performed by: STUDENT IN AN ORGANIZED HEALTH CARE EDUCATION/TRAINING PROGRAM

## 2020-09-15 PROCEDURE — 90792 PR PSYCHIATRIC DIAGNOSTIC EVALUATION W/MEDICAL SERVICES: ICD-10-PCS | Mod: S$GLB,,, | Performed by: STUDENT IN AN ORGANIZED HEALTH CARE EDUCATION/TRAINING PROGRAM

## 2020-09-15 PROCEDURE — 99999 PR PBB SHADOW E&M-EST. PATIENT-LVL III: ICD-10-PCS | Mod: PBBFAC,,, | Performed by: STUDENT IN AN ORGANIZED HEALTH CARE EDUCATION/TRAINING PROGRAM

## 2020-09-15 PROCEDURE — 99999 PR PBB SHADOW E&M-EST. PATIENT-LVL III: CPT | Mod: PBBFAC,,, | Performed by: STUDENT IN AN ORGANIZED HEALTH CARE EDUCATION/TRAINING PROGRAM

## 2020-09-15 RX ORDER — METHYLPHENIDATE HYDROCHLORIDE 18 MG/1
18 TABLET ORAL EVERY MORNING
Qty: 30 TABLET | Refills: 0 | Status: SHIPPED | OUTPATIENT
Start: 2020-09-15 | End: 2020-10-29 | Stop reason: SDUPTHER

## 2020-09-15 NOTE — PROGRESS NOTES
"  Outpatient Psychiatry Initial Visit (MD/NP)    9/15/2020    Daphnie Vazquez, a 30 y.o. female, for initial evaluation visit.  Patient is referred by       Reason for Encounter: Referral for treatment    Chief Complaint: attention deficit    She states she is having trouble "completing simple tasks, house hold chores, I'll start unloading the dishes, cooking I can't even do because I'll burn it every time." She states "reading" is difficulty, "I have to read the same page 10 times and I'm thinking of something else." She states her thoughts wander during conversations. She states attention problem began "as long ago as I can remember." She states she began medication in medical school which she states was "helpful." She reports a history of depression in 2012, took Zoloft for 1 year after a death in the family, stayed depressed for 6 months. She states has not had any depressive episodes since that time. History of one panic attack in past, did not recur.    Symptoms of ADHD: inattention (+difficult attending to details, +sustaining attention, +trouble listening, +trouble completing tasks,  + trouble organizing, +forgetfulness, avoidance, +easily distracted, +often looses things); hyperactivity (- fidgets and squirms, +leaves seat when expected to sit, - restlessness, - unable to sit quietly,- is on the go or "driven by a motor," +excessive talking, +blurts out answers before question is completed, +often interrupts or intrudes, + has trouble waiting turn or impatience)    Symptoms of Depression: diminished mood or loss of interest/anhedonia - Yes; diminished energy - Yes, change in sleep - Yes, change in appetite - No, diminished concentration or cognition or indecisiveness - Yes, PMA/R -  No, excessive guilt or hopelessness or worthlessness - No, suicidal ideations - No    Changes in Sleep: trouble with initiation- No, maintenance, - No early morning awakening with inability to return to sleep - No, " hypersomnolence - No    Suicidal- active/passive ideations - No, organized plans, future intentions - No    Homicidal ideations: active/passive ideations - No, organized plans, future intentions - No    Symptoms of psychosis: hallucinations - No, delusions - No, disorganized thinking - No, disorganized behavior or abnormal motor behavior - No, or negative symptoms (diminshed emotional expression, avolition, anhedonia, alogia, asociality) - No     Symptoms of disha or hypomania: elevated, expansive, or irritable mood with increased energy or activity - No; with inflated self-esteem or grandiosity - No, decreased need for sleep - No, increased rate of speech - No, FOI or racing thoughts - No, distractibility - No, increased goal directed activity or PMA - No, risky/disinhibited behavior - No    Symptoms of MALISSA: excessive anxiety/worry/fear, more days than not, about numerous issues - No, difficult to control - No, with restlessness - No, fatigue - No, poor concentration - No, irritability - No, muscle tension - No, sleep disturbance - No; causes functionally impairing distress - No    Symptoms of Panic Disorder: recurrent panic attacks (palpitations/heart racing, sweating, shakiness, dyspnea, choking, chest pain/discomfort, Gi symptoms, dizzy/lightheadedness, hot/col flashes, paresthesias, derealization, fear of losing control or fear of dying) - No, precipitated - No, un-precipitated - No, source of worry and/or behavioral changes secondary - No, agoraphobia - No    Symptoms of PTSD: h/o trauma - No; re-experiencing/intrusive symptoms - No, avoidant behavior - No, negative alterations in cognition or mood - No, hyperarousal symptoms - No; with dissociative symptoms - No    Symptoms of OCD: obsessions (recurrent thoughts/urges/images; intrusive and/or unwanted; uses other thoughts/actions to suppress) - No; compulsions (repetitive behaviors used to lower distress/anxiety/obsessions) - No    Symptoms of Eating  "Disorders: anorexia - No, bulimia - No or binging- No      Current Medications:  Scheduled Meds:   etonogestreL  68 mg Implant      Continuous Infusions:  PRN Meds:    Stressors:      History:     PAST PSYCHIATRIC HISTORY  Previous Psychiatric Hospitalizations: denies  Previous SI/HI: denies  Previous Suicide Attempts: denies  Previous Medication Trials: Concerta 36 mg PO qd, Ritalin "I don't remember the dose"  Psychiatric Care (current & past): no  History of Psychotherapy: no  History of Violence: no    PAST MEDICAL & SURGICAL HISTORY   Active Ambulatory Problems     Diagnosis Date Noted    Thyroid nodule 02/20/2017    Attention deficit hyperactivity disorder (ADHD), predominantly hyperactive type 09/06/2017    History of abnormal cervical Pap smear 03/14/2018    Palpitations 08/07/2019    SVT (supraventricular tachycardia) 02/26/2020    Premature ventricular contractions (PVCs) (VPCs) 02/26/2020     Resolved Ambulatory Problems     Diagnosis Date Noted    No Resolved Ambulatory Problems     Past Medical History:   Diagnosis Date    Abnormal Pap smear of cervix     ADHD (attention deficit hyperactivity disorder)     Raynaud phenomenon     Thyroid disease     Vaccine for human papilloma virus (HPV) types 6, 11, 16, and 18 administered 2009       Medication List with Changes/Refills   Current Medications    ETONOGESTREL (NEXPLANON) 68 MG IMPL    by Subdermal route.       ALLERGIES   Review of patient's allergies indicates:  No Known Allergies    Vitals:    09/15/20 1329   BP: 130/73   Pulse: 81   Resp: 18   Temp: 98.1 °F (36.7 °C)       NEUROLOGIC HISTORY  Seizures: No  Head trauma: No    SOCIAL HISTORY:  Developmental/Childhood:Achieved all developmental milestones timely  Education:Professional Master's/PhD  Employment Status/Finances:Employed   Relationship Status/Sexual Orientation: Partnered:  Children: 0  Housing Status: Home   Access to Firearms: YES: "for protection"   ;  Locked up? " "no,  Denominational:Non-spiritual  Recreational activities: "hiking, fishing, camping"    SUBSTANCE ABUSE HISTORY   Recreational Drugs: denies  Use of Alcohol: occasional, social use; 1 glass of wine 2x per week  Rehab History:no   Tobacco Use:no    LEGAL HISTORY:   Past charges/incarcerations: No   Pending charges:No     FAMILY PSYCHIATRIC HISTORY   Brother- depression, suicide attempts, uses heroin  Father- alcohol use disorder  Mother- "terrible anxiety and depression"          Record Review: extensive      Review Of Systems:     Medical Review Of Systems:  Review of Systems   Constitutional: Negative for chills and fever.   HENT: Negative for hearing loss.    Eyes: Negative for blurred vision and double vision.   Respiratory: Negative for shortness of breath.    Cardiovascular: Negative for chest pain and palpitations.   Gastrointestinal: Negative for constipation, diarrhea, nausea and vomiting.   Genitourinary: Negative for dysuria.   Musculoskeletal: Negative for back pain and neck pain.   Skin: Negative for rash.   Neurological: Negative for dizziness and headaches.   Endo/Heme/Allergies: Negative for environmental allergies.   Psychiatric/Behavioral: Negative for depression.         Current Evaluation:       Mental Status Evaluation:  Appearance:  unremarkable, age appropriate   Behavior:  normal, cooperative   Speech:  no latency; no press   Mood:  "good"   Affect:  congruent and appropriate   Thought Process:  normal and logical   Thought Content:  normal, no suicidality, no homicidality, delusions, or paranoia   Sensorium:  grossly intact   Cognition:  grossly intact   Insight:  intact   Judgment:  behavior is adequate to circumstances         Assessment - Diagnosis - Goals:     ADHD  - patient brought in psychological testing that had been completed, showing deficits in attention testing  - discussed risks with pt's cardiologist, Dr. Haines, who does not feel patient is at risk of adverse cardiac events with " stimulant medication as patient is now s/p ablation for SVT and medication is not contraindicated  - start previous effective medication, Concerta, at low dose 18 mg PO qd and reassess for tolerability, effectiveness    Discussed diagnosis, risks and benefits of proposed treatment vs alternative treatments vs no treatment, and potential side effects of these treatments including but not limited to . The patient expresses understanding of the above and displays the capacity to agree with this treatment given said understanding. Patient also agrees that, currently, the benefits outweigh the risks and would like to pursue/continue treatment at this time.      Treatment Goals:  Specify outcomes written in observable, behavioral terms:   ADHD symptoms controlled    Treatment Plan/Recommendations: see above    RTC in 1 - 3 months    Dima Jaramillo III

## 2020-09-18 DIAGNOSIS — Z01.84 ANTIBODY RESPONSE EXAMINATION: ICD-10-CM

## 2020-10-18 DIAGNOSIS — Z01.84 ANTIBODY RESPONSE EXAMINATION: ICD-10-CM

## 2020-10-26 RX ORDER — ONDANSETRON 8 MG/1
8 TABLET, ORALLY DISINTEGRATING ORAL EVERY 12 HOURS PRN
Qty: 10 TABLET | Refills: 0 | Status: SHIPPED | OUTPATIENT
Start: 2020-10-26 | End: 2021-05-14

## 2020-10-28 ENCOUNTER — PATIENT MESSAGE (OUTPATIENT)
Dept: PSYCHIATRY | Facility: CLINIC | Age: 30
End: 2020-10-28

## 2020-10-29 RX ORDER — METHYLPHENIDATE HYDROCHLORIDE 18 MG/1
18 TABLET ORAL EVERY MORNING
Qty: 30 TABLET | Refills: 0 | Status: SHIPPED | OUTPATIENT
Start: 2020-10-29 | End: 2020-10-30 | Stop reason: SDUPTHER

## 2020-10-30 RX ORDER — METHYLPHENIDATE HYDROCHLORIDE 18 MG/1
18 TABLET ORAL EVERY MORNING
Qty: 30 TABLET | Refills: 0 | Status: SHIPPED | OUTPATIENT
Start: 2020-11-27 | End: 2021-01-08 | Stop reason: SDUPTHER

## 2020-11-17 DIAGNOSIS — Z01.84 ANTIBODY RESPONSE EXAMINATION: ICD-10-CM

## 2020-12-22 ENCOUNTER — IMMUNIZATION (OUTPATIENT)
Dept: INTERNAL MEDICINE | Facility: CLINIC | Age: 30
End: 2020-12-22
Payer: COMMERCIAL

## 2020-12-22 DIAGNOSIS — Z23 NEED FOR VACCINATION: ICD-10-CM

## 2020-12-22 PROCEDURE — 91300 COVID-19, MRNA, LNP-S, PF, 30 MCG/0.3 ML DOSE VACCINE: ICD-10-PCS | Mod: ,,, | Performed by: INTERNAL MEDICINE

## 2020-12-22 PROCEDURE — 91300 COVID-19, MRNA, LNP-S, PF, 30 MCG/0.3 ML DOSE VACCINE: CPT | Mod: ,,, | Performed by: INTERNAL MEDICINE

## 2020-12-22 PROCEDURE — 0001A COVID-19, MRNA, LNP-S, PF, 30 MCG/0.3 ML DOSE VACCINE: CPT | Mod: CV19,,, | Performed by: INTERNAL MEDICINE

## 2020-12-22 PROCEDURE — 0001A COVID-19, MRNA, LNP-S, PF, 30 MCG/0.3 ML DOSE VACCINE: ICD-10-PCS | Mod: CV19,,, | Performed by: INTERNAL MEDICINE

## 2020-12-28 ENCOUNTER — TELEPHONE (OUTPATIENT)
Dept: OPTOMETRY | Facility: CLINIC | Age: 30
End: 2020-12-28

## 2021-01-05 ENCOUNTER — OFFICE VISIT (OUTPATIENT)
Dept: OBSTETRICS AND GYNECOLOGY | Facility: CLINIC | Age: 31
End: 2021-01-05
Payer: COMMERCIAL

## 2021-01-05 ENCOUNTER — LAB VISIT (OUTPATIENT)
Dept: LAB | Facility: OTHER | Age: 31
End: 2021-01-05
Attending: OBSTETRICS & GYNECOLOGY
Payer: COMMERCIAL

## 2021-01-05 VITALS
HEIGHT: 69 IN | DIASTOLIC BLOOD PRESSURE: 76 MMHG | SYSTOLIC BLOOD PRESSURE: 118 MMHG | WEIGHT: 128.06 LBS | BODY MASS INDEX: 18.97 KG/M2

## 2021-01-05 DIAGNOSIS — N89.8 VAGINAL LESION: ICD-10-CM

## 2021-01-05 DIAGNOSIS — Z11.3 SCREEN FOR STD (SEXUALLY TRANSMITTED DISEASE): ICD-10-CM

## 2021-01-05 DIAGNOSIS — Z01.419 ENCOUNTER FOR ANNUAL ROUTINE GYNECOLOGICAL EXAMINATION: Primary | ICD-10-CM

## 2021-01-05 DIAGNOSIS — Z20.2 POSSIBLE EXPOSURE TO STD: ICD-10-CM

## 2021-01-05 PROCEDURE — 86696 HERPES SIMPLEX TYPE 2 TEST: CPT

## 2021-01-05 PROCEDURE — 86592 SYPHILIS TEST NON-TREP QUAL: CPT

## 2021-01-05 PROCEDURE — 1126F AMNT PAIN NOTED NONE PRSNT: CPT | Mod: S$GLB,,, | Performed by: OBSTETRICS & GYNECOLOGY

## 2021-01-05 PROCEDURE — 87624 HPV HI-RISK TYP POOLED RSLT: CPT

## 2021-01-05 PROCEDURE — 99395 PR PREVENTIVE VISIT,EST,18-39: ICD-10-PCS | Mod: 25,S$GLB,, | Performed by: OBSTETRICS & GYNECOLOGY

## 2021-01-05 PROCEDURE — 3008F PR BODY MASS INDEX (BMI) DOCUMENTED: ICD-10-PCS | Mod: CPTII,S$GLB,, | Performed by: OBSTETRICS & GYNECOLOGY

## 2021-01-05 PROCEDURE — 99999 PR PBB SHADOW E&M-EST. PATIENT-LVL III: CPT | Mod: PBBFAC,,, | Performed by: OBSTETRICS & GYNECOLOGY

## 2021-01-05 PROCEDURE — 99395 PREV VISIT EST AGE 18-39: CPT | Mod: 25,S$GLB,, | Performed by: OBSTETRICS & GYNECOLOGY

## 2021-01-05 PROCEDURE — 86703 HIV-1/HIV-2 1 RESULT ANTBDY: CPT

## 2021-01-05 PROCEDURE — 99999 PR PBB SHADOW E&M-EST. PATIENT-LVL III: ICD-10-PCS | Mod: PBBFAC,,, | Performed by: OBSTETRICS & GYNECOLOGY

## 2021-01-05 PROCEDURE — 88175 CYTOPATH C/V AUTO FLUID REDO: CPT

## 2021-01-05 PROCEDURE — 3008F BODY MASS INDEX DOCD: CPT | Mod: CPTII,S$GLB,, | Performed by: OBSTETRICS & GYNECOLOGY

## 2021-01-05 PROCEDURE — 86695 HERPES SIMPLEX TYPE 1 TEST: CPT

## 2021-01-05 PROCEDURE — 36415 COLL VENOUS BLD VENIPUNCTURE: CPT

## 2021-01-05 PROCEDURE — 1126F PR PAIN SEVERITY QUANTIFIED, NO PAIN PRESENT: ICD-10-PCS | Mod: S$GLB,,, | Performed by: OBSTETRICS & GYNECOLOGY

## 2021-01-05 PROCEDURE — 80074 ACUTE HEPATITIS PANEL: CPT

## 2021-01-06 LAB
HAV IGM SERPL QL IA: NEGATIVE
HBV CORE IGM SERPL QL IA: NEGATIVE
HBV SURFACE AG SERPL QL IA: NEGATIVE
HCV AB SERPL QL IA: NEGATIVE
HIV 1+2 AB+HIV1 P24 AG SERPL QL IA: NEGATIVE
RPR SER QL: NORMAL

## 2021-01-08 ENCOUNTER — OFFICE VISIT (OUTPATIENT)
Dept: PSYCHIATRY | Facility: CLINIC | Age: 31
End: 2021-01-08
Payer: COMMERCIAL

## 2021-01-08 VITALS
HEIGHT: 68 IN | RESPIRATION RATE: 18 BRPM | HEART RATE: 82 BPM | DIASTOLIC BLOOD PRESSURE: 78 MMHG | WEIGHT: 129.75 LBS | BODY MASS INDEX: 19.66 KG/M2 | SYSTOLIC BLOOD PRESSURE: 130 MMHG | TEMPERATURE: 98 F

## 2021-01-08 DIAGNOSIS — I47.10 SVT (SUPRAVENTRICULAR TACHYCARDIA): ICD-10-CM

## 2021-01-08 DIAGNOSIS — F90.1 ATTENTION DEFICIT HYPERACTIVITY DISORDER (ADHD), PREDOMINANTLY HYPERACTIVE TYPE: Primary | ICD-10-CM

## 2021-01-08 LAB
HSV1 IGG SERPL QL IA: NEGATIVE
HSV2 IGG SERPL QL IA: NEGATIVE

## 2021-01-08 PROCEDURE — 3008F PR BODY MASS INDEX (BMI) DOCUMENTED: ICD-10-PCS | Mod: CPTII,S$GLB,, | Performed by: STUDENT IN AN ORGANIZED HEALTH CARE EDUCATION/TRAINING PROGRAM

## 2021-01-08 PROCEDURE — 1126F AMNT PAIN NOTED NONE PRSNT: CPT | Mod: S$GLB,,, | Performed by: STUDENT IN AN ORGANIZED HEALTH CARE EDUCATION/TRAINING PROGRAM

## 2021-01-08 PROCEDURE — 99213 PR OFFICE/OUTPT VISIT, EST, LEVL III, 20-29 MIN: ICD-10-PCS | Mod: S$GLB,,, | Performed by: STUDENT IN AN ORGANIZED HEALTH CARE EDUCATION/TRAINING PROGRAM

## 2021-01-08 PROCEDURE — 99999 PR PBB SHADOW E&M-EST. PATIENT-LVL III: ICD-10-PCS | Mod: PBBFAC,,, | Performed by: STUDENT IN AN ORGANIZED HEALTH CARE EDUCATION/TRAINING PROGRAM

## 2021-01-08 PROCEDURE — 99999 PR PBB SHADOW E&M-EST. PATIENT-LVL III: CPT | Mod: PBBFAC,,, | Performed by: STUDENT IN AN ORGANIZED HEALTH CARE EDUCATION/TRAINING PROGRAM

## 2021-01-08 PROCEDURE — 3008F BODY MASS INDEX DOCD: CPT | Mod: CPTII,S$GLB,, | Performed by: STUDENT IN AN ORGANIZED HEALTH CARE EDUCATION/TRAINING PROGRAM

## 2021-01-08 PROCEDURE — 99213 OFFICE O/P EST LOW 20 MIN: CPT | Mod: S$GLB,,, | Performed by: STUDENT IN AN ORGANIZED HEALTH CARE EDUCATION/TRAINING PROGRAM

## 2021-01-08 PROCEDURE — 1126F PR PAIN SEVERITY QUANTIFIED, NO PAIN PRESENT: ICD-10-PCS | Mod: S$GLB,,, | Performed by: STUDENT IN AN ORGANIZED HEALTH CARE EDUCATION/TRAINING PROGRAM

## 2021-01-08 RX ORDER — METHYLPHENIDATE HYDROCHLORIDE 18 MG/1
18 TABLET ORAL EVERY MORNING
Qty: 30 TABLET | Refills: 0 | Status: SHIPPED | OUTPATIENT
Start: 2021-03-10

## 2021-01-08 RX ORDER — METHYLPHENIDATE HYDROCHLORIDE 18 MG/1
18 TABLET ORAL EVERY MORNING
Qty: 30 TABLET | Refills: 0 | Status: SHIPPED | OUTPATIENT
Start: 2021-02-10

## 2021-01-08 RX ORDER — METHYLPHENIDATE HYDROCHLORIDE 18 MG/1
18 TABLET ORAL EVERY MORNING
Qty: 30 TABLET | Refills: 0 | Status: SHIPPED | OUTPATIENT
Start: 2021-01-13

## 2021-01-10 LAB
A VAGINAE DNA VAG QL NAA+PROBE: NORMAL SCORE
BVAB2 DNA VAG QL NAA+PROBE: NORMAL SCORE
C ALBICANS DNA VAG QL NAA+PROBE: NEGATIVE
C GLABRATA DNA VAG QL NAA+PROBE: NEGATIVE
C TRACH DNA VAG QL NAA+PROBE: NEGATIVE
MEGA1 DNA VAG QL NAA+PROBE: NORMAL SCORE
N GONORRHOEA DNA VAG QL NAA+PROBE: NEGATIVE
T VAGINALIS DNA VAG QL NAA+PROBE: NEGATIVE

## 2021-01-13 ENCOUNTER — IMMUNIZATION (OUTPATIENT)
Dept: PRIMARY CARE CLINIC | Facility: CLINIC | Age: 31
End: 2021-01-13
Payer: COMMERCIAL

## 2021-01-13 DIAGNOSIS — Z23 NEED FOR VACCINATION: ICD-10-CM

## 2021-01-13 PROCEDURE — 91300 COVID-19, MRNA, LNP-S, PF, 30 MCG/0.3 ML DOSE VACCINE: CPT | Mod: S$GLB,,, | Performed by: FAMILY MEDICINE

## 2021-01-13 PROCEDURE — 0002A COVID-19, MRNA, LNP-S, PF, 30 MCG/0.3 ML DOSE VACCINE: ICD-10-PCS | Mod: S$GLB,,, | Performed by: FAMILY MEDICINE

## 2021-01-13 PROCEDURE — 0002A COVID-19, MRNA, LNP-S, PF, 30 MCG/0.3 ML DOSE VACCINE: CPT | Mod: S$GLB,,, | Performed by: FAMILY MEDICINE

## 2021-01-13 PROCEDURE — 91300 COVID-19, MRNA, LNP-S, PF, 30 MCG/0.3 ML DOSE VACCINE: ICD-10-PCS | Mod: S$GLB,,, | Performed by: FAMILY MEDICINE

## 2021-01-19 LAB
HPV HR 12 DNA SPEC QL NAA+PROBE: NEGATIVE
HPV16 AG SPEC QL: NEGATIVE
HPV18 DNA SPEC QL NAA+PROBE: NEGATIVE

## 2021-02-03 LAB
FINAL PATHOLOGIC DIAGNOSIS: NORMAL
Lab: NORMAL

## 2021-02-06 ENCOUNTER — PATIENT MESSAGE (OUTPATIENT)
Dept: OBSTETRICS AND GYNECOLOGY | Facility: CLINIC | Age: 31
End: 2021-02-06

## 2021-04-12 ENCOUNTER — OFFICE VISIT (OUTPATIENT)
Dept: OPTOMETRY | Facility: CLINIC | Age: 31
End: 2021-04-12

## 2021-04-12 ENCOUNTER — OFFICE VISIT (OUTPATIENT)
Dept: OPTOMETRY | Facility: CLINIC | Age: 31
End: 2021-04-12
Payer: COMMERCIAL

## 2021-04-12 DIAGNOSIS — Z46.0 FITTING AND ADJUSTMENT OF SPECTACLES AND CONTACT LENSES: Primary | ICD-10-CM

## 2021-04-12 DIAGNOSIS — H52.13 MYOPIA, BILATERAL: Primary | ICD-10-CM

## 2021-04-12 PROCEDURE — 92310 PR CONTACT LENS FITTING (NO CHANGE): ICD-10-PCS | Mod: CSM,,, | Performed by: OPTOMETRIST

## 2021-04-12 PROCEDURE — 99999 PR PBB SHADOW E&M-EST. PATIENT-LVL II: ICD-10-PCS | Mod: PBBFAC,,, | Performed by: OPTOMETRIST

## 2021-04-12 PROCEDURE — 92015 PR REFRACTION: ICD-10-PCS | Mod: S$GLB,,, | Performed by: OPTOMETRIST

## 2021-04-12 PROCEDURE — 99999 PR PBB SHADOW E&M-EST. PATIENT-LVL II: CPT | Mod: PBBFAC,,, | Performed by: OPTOMETRIST

## 2021-04-12 PROCEDURE — 92014 PR EYE EXAM, EST PATIENT,COMPREHESV: ICD-10-PCS | Mod: S$GLB,,, | Performed by: OPTOMETRIST

## 2021-04-12 PROCEDURE — 92310 CONTACT LENS FITTING OU: CPT | Mod: CSM,,, | Performed by: OPTOMETRIST

## 2021-04-12 PROCEDURE — 92014 COMPRE OPH EXAM EST PT 1/>: CPT | Mod: S$GLB,,, | Performed by: OPTOMETRIST

## 2021-04-12 PROCEDURE — 92015 DETERMINE REFRACTIVE STATE: CPT | Mod: S$GLB,,, | Performed by: OPTOMETRIST

## 2021-05-10 ENCOUNTER — PATIENT OUTREACH (OUTPATIENT)
Dept: ADMINISTRATIVE | Facility: HOSPITAL | Age: 31
End: 2021-05-10

## 2021-05-14 ENCOUNTER — OFFICE VISIT (OUTPATIENT)
Dept: INTERNAL MEDICINE | Facility: CLINIC | Age: 31
End: 2021-05-14
Payer: COMMERCIAL

## 2021-05-14 VITALS
SYSTOLIC BLOOD PRESSURE: 98 MMHG | BODY MASS INDEX: 19.2 KG/M2 | TEMPERATURE: 98 F | HEART RATE: 55 BPM | WEIGHT: 129.63 LBS | HEIGHT: 69 IN | OXYGEN SATURATION: 100 % | DIASTOLIC BLOOD PRESSURE: 62 MMHG

## 2021-05-14 DIAGNOSIS — Z02.0 SCHOOL PHYSICAL EXAM: ICD-10-CM

## 2021-05-14 DIAGNOSIS — Z00.00 ANNUAL PHYSICAL EXAM: Primary | ICD-10-CM

## 2021-05-14 PROCEDURE — 3008F PR BODY MASS INDEX (BMI) DOCUMENTED: ICD-10-PCS | Mod: CPTII,S$GLB,, | Performed by: FAMILY MEDICINE

## 2021-05-14 PROCEDURE — 3008F BODY MASS INDEX DOCD: CPT | Mod: CPTII,S$GLB,, | Performed by: FAMILY MEDICINE

## 2021-05-14 PROCEDURE — 1126F PR PAIN SEVERITY QUANTIFIED, NO PAIN PRESENT: ICD-10-PCS | Mod: S$GLB,,, | Performed by: FAMILY MEDICINE

## 2021-05-14 PROCEDURE — 80307 DRUG TEST PRSMV CHEM ANLYZR: CPT | Performed by: FAMILY MEDICINE

## 2021-05-14 PROCEDURE — 99395 PREV VISIT EST AGE 18-39: CPT | Mod: S$GLB,,, | Performed by: FAMILY MEDICINE

## 2021-05-14 PROCEDURE — 99999 PR PBB SHADOW E&M-EST. PATIENT-LVL IV: CPT | Mod: PBBFAC,,, | Performed by: FAMILY MEDICINE

## 2021-05-14 PROCEDURE — 99395 PR PREVENTIVE VISIT,EST,18-39: ICD-10-PCS | Mod: S$GLB,,, | Performed by: FAMILY MEDICINE

## 2021-05-14 PROCEDURE — 1126F AMNT PAIN NOTED NONE PRSNT: CPT | Mod: S$GLB,,, | Performed by: FAMILY MEDICINE

## 2021-05-14 PROCEDURE — 99999 PR PBB SHADOW E&M-EST. PATIENT-LVL IV: ICD-10-PCS | Mod: PBBFAC,,, | Performed by: FAMILY MEDICINE

## 2021-05-19 ENCOUNTER — LAB VISIT (OUTPATIENT)
Dept: LAB | Facility: HOSPITAL | Age: 31
End: 2021-05-19
Attending: FAMILY MEDICINE
Payer: COMMERCIAL

## 2021-05-19 DIAGNOSIS — Z02.0 SCHOOL PHYSICAL EXAM: ICD-10-CM

## 2021-05-19 PROCEDURE — 86480 TB TEST CELL IMMUN MEASURE: CPT | Performed by: FAMILY MEDICINE

## 2021-05-19 PROCEDURE — 36415 COLL VENOUS BLD VENIPUNCTURE: CPT | Performed by: FAMILY MEDICINE

## 2021-05-20 LAB
GAMMA INTERFERON BACKGROUND BLD IA-ACNC: 0.02 IU/ML
M TB IFN-G CD4+ BCKGRND COR BLD-ACNC: 0 IU/ML
MITOGEN IGNF BCKGRD COR BLD-ACNC: 6.46 IU/ML
TB GOLD PLUS: NEGATIVE
TB2 - NIL: 0 IU/ML

## 2021-05-21 ENCOUNTER — PATIENT MESSAGE (OUTPATIENT)
Dept: INTERNAL MEDICINE | Facility: CLINIC | Age: 31
End: 2021-05-21

## 2021-05-22 LAB
6MAM UR QL: NOT DETECTED
7AMINOCLONAZEPAM UR QL: NOT DETECTED
A-OH ALPRAZ UR QL: NOT DETECTED
ALPHA-OH-MIDAZOLAM: NOT DETECTED
ALPRAZ UR QL: NOT DETECTED
AMPHET UR QL SCN: NOT DETECTED
ANNOTATION COMMENT IMP: NORMAL
ANNOTATION COMMENT IMP: NORMAL
BARBITURATES UR QL: NOT DETECTED
BUPRENORPHINE UR QL: NOT DETECTED
BZE UR QL: NOT DETECTED
CARBOXYTHC UR QL: NOT DETECTED
CARISOPRODOL UR QL: NOT DETECTED
CLONAZEPAM UR QL: NOT DETECTED
CODEINE UR QL: NOT DETECTED
CREAT UR-MCNC: 136.5 MG/DL (ref 20–400)
DIAZEPAM UR QL: NOT DETECTED
ETHYL GLUCURONIDE UR QL: NOT DETECTED
FENTANYL UR QL: NOT DETECTED
GABAPENTIN: NOT DETECTED
HYDROCODONE UR QL: NOT DETECTED
HYDROMORPHONE UR QL: NOT DETECTED
LORAZEPAM UR QL: NOT DETECTED
MDA UR QL: NOT DETECTED
MDEA UR QL: NOT DETECTED
MDMA UR QL: NOT DETECTED
ME-PHENIDATE UR QL: NOT DETECTED
METHADONE UR QL: NOT DETECTED
METHAMPHET UR QL: NOT DETECTED
MIDAZOLAM UR QL SCN: NOT DETECTED
MORPHINE UR QL: NOT DETECTED
NALOXONE: NOT DETECTED
NORBUPRENORPHINE UR QL CFM: NOT DETECTED
NORDIAZEPAM UR QL: NOT DETECTED
NORFENTANYL UR QL: NOT DETECTED
NORHYDROCODONE UR QL CFM: NOT DETECTED
NORMEPERIDINE UR QL CFM: NOT DETECTED
NOROXYCODONE UR QL CFM: NOT DETECTED
NOROXYMORPHONE UR QL SCN: NOT DETECTED
OXAZEPAM UR QL: NOT DETECTED
OXYCODONE UR QL: NOT DETECTED
OXYMORPHONE UR QL: NOT DETECTED
PATHOLOGY STUDY: NORMAL
PCP UR QL: NOT DETECTED
PHENTERMINE UR QL: NOT DETECTED
PREGABALIN: NOT DETECTED
SERVICE CMNT-IMP: NORMAL
TAPENTADOL UR QL SCN: NOT DETECTED
TAPENTADOL-O-SULF: NOT DETECTED
TEMAZEPAM UR QL: NOT DETECTED
TRAMADOL UR QL: NOT DETECTED
ZOLPIDEM METABOLITE: NOT DETECTED
ZOLPIDEM UR QL: NOT DETECTED

## 2021-05-25 ENCOUNTER — PATIENT MESSAGE (OUTPATIENT)
Dept: INTERNAL MEDICINE | Facility: CLINIC | Age: 31
End: 2021-05-25

## 2021-05-25 DIAGNOSIS — Z00.00 ANNUAL PHYSICAL EXAM: Primary | ICD-10-CM

## 2021-06-23 ENCOUNTER — LAB VISIT (OUTPATIENT)
Dept: LAB | Facility: HOSPITAL | Age: 31
End: 2021-06-23
Attending: FAMILY MEDICINE
Payer: COMMERCIAL

## 2021-06-23 DIAGNOSIS — Z00.00 ANNUAL PHYSICAL EXAM: ICD-10-CM

## 2021-06-23 LAB
ESTIMATED AVG GLUCOSE: 105 MG/DL (ref 68–131)
HBA1C MFR BLD: 5.3 % (ref 4–5.6)

## 2021-06-23 PROCEDURE — 83036 HEMOGLOBIN GLYCOSYLATED A1C: CPT | Performed by: FAMILY MEDICINE

## 2021-06-23 PROCEDURE — 36415 COLL VENOUS BLD VENIPUNCTURE: CPT | Performed by: FAMILY MEDICINE

## 2021-12-19 NOTE — TELEPHONE ENCOUNTER
Spoke with patient. She informed me that she never placed the ZIO patch like she was to do in August. I went over the instructions with her. I asked that she give me a call with the SN of the device and the time she places it. Patient stated understanding.   ----- Message from Wilton Davis MA sent at 11/20/2019 11:47 AM CST -----  Contact: Self      ----- Message -----  From: Day Ferrell RN  Sent: 11/20/2019  11:42 AM CST  To: [facility] Staff        ----- Message -----  From: Tatiana Lobo  Sent: 11/20/2019  10:52 AM CST  To: Day Ferrell RN    Pt calling regarding ion patch. If can call or leave a message through her portal.  Thanks    [phone]    
no

## 2022-02-07 NOTE — PROGRESS NOTES
Subjective:       Patient ID: Daphnie Vazquez is a 32 y.o. female.    Vitals:  temperature is 98.5 °F (36.9 °C). Her pulse is 82. Her oxygen saturation is 98%.     Chief Complaint: covid swab    HPI  ROS    Objective:      Physical Exam      Assessment:       No diagnosis found.      Plan:         There are no diagnoses linked to this encounter.             CDC Testing and Quarantine Guidelines for Exposure:         A close exposure is defined as anyone who has had an exposure (masked or unmasked) to a known COVID -19 positive person within 6 ft for longer than 15 minutes. If your exposure meets this definition you are required by CDC guidelines to quarantine for at least 7-10 days from time of exposure. The CDC states that a test can be performed for an asymptomatic patient (someone who does not have any symptoms) after a close exposure, and that a test should be done if you develop symptoms after a close exposure as described above.  Specifically, you can test at day 5 or later if asymptomatic, in order to get released from quarantine on day 7 or later.  If you develop symptoms sooner, you should test when your symptoms start.  If you meet the definition of a close exposure, it will not matter whether you are experiencing symptoms- a quarantine for at least 7-10 days after a close exposure is required by CDC guidelines.  Please note, if you decide to test as an asymptomatic during your quarantine and you are positive, you will be restarting your quarantine and moving from a possible 10 day quarantine (if you do not test), to a 11 day or greater quarantine.  The CDC also suggests people still monitor for symptoms for a full 14 days and remember that the shorter quarantine options do not replace initial CDC guidance.  The CDC continues to recommend quarantining for 14 days as the best way to reduce risk for spreading COVID-19 - however, this is only a recommendation.  If your exposure does not meet the above  definition, you can return to your normal daily activities to include social distancing, wearing a mask and frequent handwashing

## (undated) DEVICE — ELECTRODE POLYHESIVEPRE-ATTACH

## (undated) DEVICE — HEMOSTAT SURGICEL 2X3IN

## (undated) DEVICE — HEMOSTAT SURGICEL FIBRLR 1X2IN

## (undated) DEVICE — PAD DEFIB CADENCE ADULT R2

## (undated) DEVICE — KIT EVACUATOR FULL PERF 100CC

## (undated) DEVICE — SUT VICRYL CTD 2-0 GI 27 SH

## (undated) DEVICE — DRAPE THYROID WITH ARMBOARD

## (undated) DEVICE — CATH SAFIRE 7FR 4CC LG SWEEP

## (undated) DEVICE — SUT SILK 2-0 SH 18IN BLACK

## (undated) DEVICE — SEE MEDLINE ITEM 146417

## (undated) DEVICE — DRESSING TELFA PAD N ADH 2X3

## (undated) DEVICE — ADHESIVE MASTISOL VIAL 48/BX

## (undated) DEVICE — DRESSING TELFA STRL 4X3 LF

## (undated) DEVICE — SUT 2/0 30IN SILK BLK BRAI

## (undated) DEVICE — SHEARS HARMONIC CRVD 9 CM

## (undated) DEVICE — PACK EP DRAPE

## (undated) DEVICE — APPLIER CLIP LIAGCLIP 9.375IN

## (undated) DEVICE — DRESSING TEGADERM CHG 4X4.5

## (undated) DEVICE — PATCH ENSITE PRECISION NAVX SE

## (undated) DEVICE — SUT SILK 3-0 SH 18IN BLACK

## (undated) DEVICE — SUT SILK 0 BLK BR FSL 18 IN

## (undated) DEVICE — RETRACTOR WAVEGUIDE NAR/FLAT

## (undated) DEVICE — APPLIER LIGACLIP SM 9.38IN

## (undated) DEVICE — DRESSING TRANS 4X4 TEGADERM

## (undated) DEVICE — SUT MCRYL PLUS 4-0 PS2 27IN

## (undated) DEVICE — ELECTRODE REM PLYHSV RETURN 9

## (undated) DEVICE — SEE MEDLINE ITEM 157194

## (undated) DEVICE — CONTAINER SPECIMEN STRL 4OZ

## (undated) DEVICE — SEE MEDLINE ITEM 156930

## (undated) DEVICE — CLOSURE SKIN STERI STRIP 1/2X4

## (undated) DEVICE — CATH SUPREME QPLR CRD-2 6F 120

## (undated) DEVICE — KIT PROBE COVER WITH GEL

## (undated) DEVICE — SUT 3-0 12-18IN SILK

## (undated) DEVICE — NDL HYPO REG 25G X 1 1/2

## (undated) DEVICE — SUT VICRYL 3-0 27 SH

## (undated) DEVICE — INTRODUCER HEMOSTASIS 7.5F

## (undated) DEVICE — INTRO 8.5FR 63CM SRO

## (undated) DEVICE — SEE MEDLINE ITEM 152622

## (undated) DEVICE — CATH LIVEWIRE DCAPLR 7FRX115CM

## (undated) DEVICE — CATH RESPONSE QPLR JSN 6F 120

## (undated) DEVICE — CHLORAPREP 10.5 ML APPLICATOR

## (undated) DEVICE — SUT CTD VICRYL 3-0 UND BR

## (undated) DEVICE — SUT 2-0 12-18IN SILK